# Patient Record
Sex: FEMALE | Race: WHITE | NOT HISPANIC OR LATINO | URBAN - METROPOLITAN AREA
[De-identification: names, ages, dates, MRNs, and addresses within clinical notes are randomized per-mention and may not be internally consistent; named-entity substitution may affect disease eponyms.]

---

## 2018-04-16 ENCOUNTER — OUTPATIENT (OUTPATIENT)
Dept: OUTPATIENT SERVICES | Facility: HOSPITAL | Age: 53
LOS: 1 days | End: 2018-04-16
Payer: COMMERCIAL

## 2018-04-16 DIAGNOSIS — K22.9 DISEASE OF ESOPHAGUS, UNSPECIFIED: ICD-10-CM

## 2018-04-16 LAB — HCG UR QL: NEGATIVE — SIGNIFICANT CHANGE UP

## 2018-04-16 PROCEDURE — 88305 TISSUE EXAM BY PATHOLOGIST: CPT

## 2018-04-16 PROCEDURE — 88312 SPECIAL STAINS GROUP 1: CPT

## 2018-04-16 PROCEDURE — 43239 EGD BIOPSY SINGLE/MULTIPLE: CPT

## 2018-04-16 PROCEDURE — 45385 COLONOSCOPY W/LESION REMOVAL: CPT

## 2018-04-16 PROCEDURE — 88312 SPECIAL STAINS GROUP 1: CPT | Mod: 26

## 2018-04-16 PROCEDURE — 88305 TISSUE EXAM BY PATHOLOGIST: CPT | Mod: 26

## 2018-04-16 PROCEDURE — 81025 URINE PREGNANCY TEST: CPT

## 2018-04-18 LAB — SURGICAL PATHOLOGY FINAL REPORT - CH: SIGNIFICANT CHANGE UP

## 2019-06-01 ENCOUNTER — EMERGENCY (EMERGENCY)
Facility: HOSPITAL | Age: 54
LOS: 1 days | Discharge: HOME | End: 2019-06-01
Attending: EMERGENCY MEDICINE | Admitting: EMERGENCY MEDICINE
Payer: COMMERCIAL

## 2019-06-01 VITALS
DIASTOLIC BLOOD PRESSURE: 71 MMHG | HEART RATE: 71 BPM | RESPIRATION RATE: 17 BRPM | OXYGEN SATURATION: 98 % | TEMPERATURE: 99 F | SYSTOLIC BLOOD PRESSURE: 139 MMHG

## 2019-06-01 VITALS
HEIGHT: 65 IN | WEIGHT: 293 LBS | HEART RATE: 76 BPM | SYSTOLIC BLOOD PRESSURE: 165 MMHG | RESPIRATION RATE: 16 BRPM | OXYGEN SATURATION: 99 % | TEMPERATURE: 99 F | DIASTOLIC BLOOD PRESSURE: 94 MMHG

## 2019-06-01 LAB
ALBUMIN SERPL ELPH-MCNC: 3.7 G/DL — SIGNIFICANT CHANGE UP (ref 3.3–5)
ALP SERPL-CCNC: 51 U/L — SIGNIFICANT CHANGE UP (ref 40–120)
ALT FLD-CCNC: 55 U/L — SIGNIFICANT CHANGE UP (ref 12–78)
ANION GAP SERPL CALC-SCNC: 8 MMOL/L — SIGNIFICANT CHANGE UP (ref 5–17)
APTT BLD: 40.2 SEC — HIGH (ref 27.5–36.3)
AST SERPL-CCNC: 34 U/L — SIGNIFICANT CHANGE UP (ref 15–37)
BASOPHILS # BLD AUTO: 0.04 K/UL — SIGNIFICANT CHANGE UP (ref 0–0.2)
BASOPHILS NFR BLD AUTO: 0.5 % — SIGNIFICANT CHANGE UP (ref 0–2)
BILIRUB SERPL-MCNC: 0.3 MG/DL — SIGNIFICANT CHANGE UP (ref 0.2–1.2)
BUN SERPL-MCNC: 17 MG/DL — SIGNIFICANT CHANGE UP (ref 7–23)
CALCIUM SERPL-MCNC: 8.5 MG/DL — SIGNIFICANT CHANGE UP (ref 8.5–10.1)
CHLORIDE SERPL-SCNC: 112 MMOL/L — HIGH (ref 96–108)
CO2 SERPL-SCNC: 25 MMOL/L — SIGNIFICANT CHANGE UP (ref 22–31)
CREAT SERPL-MCNC: 0.72 MG/DL — SIGNIFICANT CHANGE UP (ref 0.5–1.3)
D DIMER BLD IA.RAPID-MCNC: <150 NG/ML DDU — SIGNIFICANT CHANGE UP
EOSINOPHIL # BLD AUTO: 0.1 K/UL — SIGNIFICANT CHANGE UP (ref 0–0.5)
EOSINOPHIL NFR BLD AUTO: 1.3 % — SIGNIFICANT CHANGE UP (ref 0–6)
GLUCOSE SERPL-MCNC: 91 MG/DL — SIGNIFICANT CHANGE UP (ref 70–99)
HCT VFR BLD CALC: 42.9 % — SIGNIFICANT CHANGE UP (ref 34.5–45)
HGB BLD-MCNC: 14.3 G/DL — SIGNIFICANT CHANGE UP (ref 11.5–15.5)
IMM GRANULOCYTES NFR BLD AUTO: 0.4 % — SIGNIFICANT CHANGE UP (ref 0–1.5)
INR BLD: 1.85 RATIO — HIGH (ref 0.88–1.16)
LACTATE SERPL-SCNC: 1.7 MMOL/L — SIGNIFICANT CHANGE UP (ref 0.7–2)
LYMPHOCYTES # BLD AUTO: 2.93 K/UL — SIGNIFICANT CHANGE UP (ref 1–3.3)
LYMPHOCYTES # BLD AUTO: 38.3 % — SIGNIFICANT CHANGE UP (ref 13–44)
MCHC RBC-ENTMCNC: 31.3 PG — SIGNIFICANT CHANGE UP (ref 27–34)
MCHC RBC-ENTMCNC: 33.3 GM/DL — SIGNIFICANT CHANGE UP (ref 32–36)
MCV RBC AUTO: 93.9 FL — SIGNIFICANT CHANGE UP (ref 80–100)
MONOCYTES # BLD AUTO: 0.49 K/UL — SIGNIFICANT CHANGE UP (ref 0–0.9)
MONOCYTES NFR BLD AUTO: 6.4 % — SIGNIFICANT CHANGE UP (ref 2–14)
NEUTROPHILS # BLD AUTO: 4.06 K/UL — SIGNIFICANT CHANGE UP (ref 1.8–7.4)
NEUTROPHILS NFR BLD AUTO: 53.1 % — SIGNIFICANT CHANGE UP (ref 43–77)
NRBC # BLD: 0 /100 WBCS — SIGNIFICANT CHANGE UP (ref 0–0)
PLATELET # BLD AUTO: 207 K/UL — SIGNIFICANT CHANGE UP (ref 150–400)
POTASSIUM SERPL-MCNC: 3.7 MMOL/L — SIGNIFICANT CHANGE UP (ref 3.5–5.3)
POTASSIUM SERPL-SCNC: 3.7 MMOL/L — SIGNIFICANT CHANGE UP (ref 3.5–5.3)
PROT SERPL-MCNC: 7.1 G/DL — SIGNIFICANT CHANGE UP (ref 6–8.3)
PROTHROM AB SERPL-ACNC: 21.3 SEC — HIGH (ref 10–12.9)
RBC # BLD: 4.57 M/UL — SIGNIFICANT CHANGE UP (ref 3.8–5.2)
RBC # FLD: 13.3 % — SIGNIFICANT CHANGE UP (ref 10.3–14.5)
SODIUM SERPL-SCNC: 145 MMOL/L — SIGNIFICANT CHANGE UP (ref 135–145)
TROPONIN I SERPL-MCNC: <.015 NG/ML — SIGNIFICANT CHANGE UP (ref 0.01–0.04)
WBC # BLD: 7.65 K/UL — SIGNIFICANT CHANGE UP (ref 3.8–10.5)
WBC # FLD AUTO: 7.65 K/UL — SIGNIFICANT CHANGE UP (ref 3.8–10.5)

## 2019-06-01 PROCEDURE — 84702 CHORIONIC GONADOTROPIN TEST: CPT

## 2019-06-01 PROCEDURE — 83605 ASSAY OF LACTIC ACID: CPT

## 2019-06-01 PROCEDURE — 85730 THROMBOPLASTIN TIME PARTIAL: CPT

## 2019-06-01 PROCEDURE — 93010 ELECTROCARDIOGRAM REPORT: CPT

## 2019-06-01 PROCEDURE — 87040 BLOOD CULTURE FOR BACTERIA: CPT

## 2019-06-01 PROCEDURE — 71260 CT THORAX DX C+: CPT

## 2019-06-01 PROCEDURE — 71260 CT THORAX DX C+: CPT | Mod: 26

## 2019-06-01 PROCEDURE — 80053 COMPREHEN METABOLIC PANEL: CPT

## 2019-06-01 PROCEDURE — 93970 EXTREMITY STUDY: CPT

## 2019-06-01 PROCEDURE — 71045 X-RAY EXAM CHEST 1 VIEW: CPT | Mod: 26

## 2019-06-01 PROCEDURE — 71045 X-RAY EXAM CHEST 1 VIEW: CPT

## 2019-06-01 PROCEDURE — 93005 ELECTROCARDIOGRAM TRACING: CPT

## 2019-06-01 PROCEDURE — 85027 COMPLETE CBC AUTOMATED: CPT

## 2019-06-01 PROCEDURE — 36415 COLL VENOUS BLD VENIPUNCTURE: CPT

## 2019-06-01 PROCEDURE — 84484 ASSAY OF TROPONIN QUANT: CPT

## 2019-06-01 PROCEDURE — 93970 EXTREMITY STUDY: CPT | Mod: 26

## 2019-06-01 PROCEDURE — 99285 EMERGENCY DEPT VISIT HI MDM: CPT

## 2019-06-01 PROCEDURE — 85379 FIBRIN DEGRADATION QUANT: CPT

## 2019-06-01 PROCEDURE — 99284 EMERGENCY DEPT VISIT MOD MDM: CPT | Mod: 25

## 2019-06-01 PROCEDURE — 85610 PROTHROMBIN TIME: CPT

## 2019-06-01 NOTE — ED ADULT NURSE NOTE - PMH
Clotting disorder    Hyperlipidemia    Hypertension Clotting disorder    Hyperlipidemia    Hypertension    Lupus

## 2019-06-01 NOTE — ED ADULT NURSE REASSESSMENT NOTE - NS ED NURSE REASSESS COMMENT FT1
Report taken from Olayinka TOMLIN RN. Pt received alert and oriented x 4, denies any chest pain, sob, palpitations. VSS. 20 G IV NOTED TO RIGHT HAND. Awaiting discharged.

## 2019-06-01 NOTE — ED PROVIDER NOTE - CLINICAL SUMMARY MEDICAL DECISION MAKING FREE TEXT BOX
chest pain with inspiration, sob x 6 weeks, recent travel, morbid obesity, will obtain labs, cxr, ekg, ce's, doppler

## 2019-06-01 NOTE — ED ADULT NURSE NOTE - NSIMPLEMENTINTERV_GEN_ALL_ED
Implemented All Universal Safety Interventions:  [place] to call system. Call bell, personal items and telephone within reach. Instruct patient to call for assistance. Room bathroom lighting operational. Non-slip footwear when patient is off stretcher. Physically safe environment: no spills, clutter or unnecessary equipment. Stretcher in lowest position, wheels locked, appropriate side rails in place.

## 2019-06-01 NOTE — ED PROVIDER NOTE - PROGRESS NOTE DETAILS
patient seen and evaluated by Dr Vangie Vásquez, cardiologist, advised can discharge patient to follow up with her cardiologist, and pmd, copy of results given, advised patient if condition worsens return to ED

## 2019-06-01 NOTE — ED PROVIDER NOTE - ATTENDING CONTRIBUTION TO CARE
pt with hx of sarcoid and lupus with nonspecific chest pain, SOB with recent travel over the past 6 weeks.  labs/imaging unremarkable.  Consult cardio: Dr. Vásquez cleared for discharge and outpt follow up.

## 2019-06-01 NOTE — ED ADULT NURSE REASSESSMENT NOTE - NSIMPLEMENTINTERV_GEN_ALL_ED
Implemented All Universal Safety Interventions:  [place] to call system. Call bell, personal items and telephone within reach. Instruct patient to call for assistance. Room bathroom lighting operational. Non-slip footwear when patient is off stretcher. Physically safe environment: no spills, clutter or unnecessary equipment. Stretcher in lowest position, wheels locked, appropriate side rails in place.

## 2019-06-01 NOTE — ED PROVIDER NOTE - OBJECTIVE STATEMENT
53 y female presents with states for past 6 weeks has had intermittent episodes of sob, chest pain with deep breathing and bilateral le swelling,  patient states she accounts the sob to her weight, states she has been doing a lot of traveling by airplane, states 6 times in 6 weeks has traveled by plane. states she is a nonsmoker,  uses cpap at hs.  states she has had a low grade temp , which she believes is from her lupus.  tmax at home 100.4.   was seen by her pmd Dr Duarte today was sent to ED for evaluation.  states she is on coumadin, was checked last week, was low at 1.8, had dose increased few days ago.

## 2019-06-01 NOTE — CONSULT NOTE ADULT - SUBJECTIVE AND OBJECTIVE BOX
CARDIOLOGY CONSULT NOTE    Patient is a 53y Female with a known history of :    HPI:      REVIEW OF SYSTEMS:    CONSTITUTIONAL: No fever, weight loss, or fatigue  EYES: No eye pain, visual disturbances, or discharge  ENMT:  No difficulty hearing, tinnitus, vertigo; No sinus or throat pain  NECK: No pain or stiffness  BREASTS: No pain, masses, or nipple discharge  RESPIRATORY: No cough, wheezing, chills or hemoptysis; No shortness of breath  CARDIOVASCULAR: No chest pain, palpitations, dizziness, or leg swelling  GASTROINTESTINAL: No abdominal or epigastric pain. No nausea, vomiting, or hematemesis; No diarrhea or constipation. No melena or hematochezia.  GENITOURINARY: No dysuria, frequency, hematuria, or incontinence  NEUROLOGICAL: No headaches, memory loss, loss of strength, numbness, or tremors  SKIN: No itching, burning, rashes, or lesions   LYMPH NODES: No enlarged glands  ENDOCRINE: No heat or cold intolerance; No hair loss  MUSCULOSKELETAL: No joint pain or swelling; No muscle, back, or extremity pain  PSYCHIATRIC: No depression, anxiety, mood swings, or difficulty sleeping  HEME/LYMPH: No easy bruising, or bleeding gums  ALLERGY AND IMMUNOLOGIC: No hives or eczema    MEDICATIONS  (STANDING):    MEDICATIONS  (PRN):      ALLERGIES: Paxil (Rash)  penicillin (Rash)      FAMILY HISTORY:      Social History:  Alochol:   Smoking:   Drug Use:   Marital Status:     I&O's Detail      PHYSICAL EXAMINATION:  -----------------------------  T(C): 37.2 (06-01-19 @ 19:19), Max: 37.2 (06-01-19 @ 13:42)  HR: 71 (06-01-19 @ 19:19) (71 - 76)  BP: 139/71 (06-01-19 @ 19:19) (139/71 - 165/94)  RR: 17 (06-01-19 @ 19:19) (16 - 17)  SpO2: 98% (06-01-19 @ 19:19) (98% - 99%)  Wt(kg): --    Height (cm): 165.1 (06-01 @ 13:42)  Weight (kg): 145.6 (06-01 @ 13:42)  BMI (kg/m2): 53.4 (06-01 @ 13:42)  BSA (m2): 2.42 (06-01 @ 13:42)    Constitutional: well developed, normal appearance, well groomed, well nourished, no deformities and no acute distress.   Eyes: the conjunctiva exhibited no abnormalities and the eyelids demonstrated no xanthelasmas.   HEENT: normal oral mucosa, no oral pallor and no oral cyanosis.   Neck: normal jugular venous A waves present, normal jugular venous V waves present and no jugular venous preston A waves.   Pulmonary: no respiratory distress, normal respiratory rhythm and effort, no accessory muscle use and lungs were clear to auscultation bilaterally.   Cardiovascular: heart rate and rhythm were normal, normal S1 and S2 and no murmur, gallop, rub, heave or thrill are present.   Abdomen: soft, non-tender, no hepato-splenomegaly and no abdominal mass palpated.   Musculoskeletal: the gait could not be assessed..   Extremities: no clubbing of the fingernails, no localized cyanosis, no petechial hemorrhages and no ischemic changes.   Skin: normal skin color and pigmentation, no rash, no venous stasis, no skin lesions, no skin ulcer and no xanthoma was observed.   Psychiatric: oriented to person, place, and time, the affect was normal, the mood was normal and not feeling anxious.     LABS:   --------  06-01    145  |  112<H>  |  17  ----------------------------<  91  3.7   |  25  |  0.72    Ca    8.5      01 Jun 2019 14:54    TPro  7.1  /  Alb  3.7  /  TBili  0.3  /  DBili  x   /  AST  34  /  ALT  55  /  AlkPhos  51  06-01                         14.3   7.65  )-----------( 207      ( 01 Jun 2019 14:54 )             42.9     PT/INR - ( 01 Jun 2019 14:54 )   PT: 21.3 sec;   INR: 1.85 ratio         PTT - ( 01 Jun 2019 14:54 )  PTT:40.2 sec    06-01 @ 14:54 CPK total:--, CKMB --, Troponin I - <.015 ng/mL [.015 - .045]            RADIOLOGY:  -----------------    < from: CT Chest w/ IV Cont (06.01.19 @ 17:15) >    EXAM:  CT CHEST IC                            PROCEDURE DATE:  06/01/2019          INTERPRETATION:  Exam Date: 6/1/2019 5:15 PM  Clinical Information: Shortness of breath  Technique:  CT of the chest was performed with axial images obtained from   the thoracic to the inlet to the bilateral adrenal glands following   administration of IV contrast 90 ml of Omnipaque 350 was injected   intravenously. Maximum intensity projection images were obtained.  Comparison:  None    FINDINGS:    Lungs, Airways and Pleura: Central tracheobronchial tree is grossly   patent. No endobronchial lesions. No pneumothorax. No pulmonary edema. No   pleural effusions. No discrete pulmonary nodules. No segmental   consolidations.    Heart and Great Vessels: Great vessels appear unremarkable. Heart is   normal in size. No pericardial effusions.    Mediastinum and Norma: within normal limits    Neck and Chest Wall: within normal limits    Bones: Mild degenerative changes.    Upper Abdomen:  Hepatomegaly with diffuse hepatic steatosis.    IMPRESSION:      No segmental consolidation. No pneumothorax.                 SHERYL SCHULER M.D., ATTENDING RADIOLOGIST  This document has been electronically signed. Jun 1 2019  5:28PM                < end of copied text >      ECG: NSR, LAD, IVCD, no acute changes

## 2019-06-01 NOTE — CONSULT NOTE ADULT - ASSESSMENT
54y/o w/f over weight. Seen at [facility]. Sister at bedside  History HTN, high cholesterol, Vitamin D deficiency, s/p TIA, Lupus, Sarcoidosis, anticardiolipin Ab (on coumadin)   Father and Brother with CAD    S/P implantable Loop recorder by Dr. Jozef Gambino for dizziness and feeling of heart racing that was removed after 3 years and nothing unusual found  S/P C/S  S/P lymph nodes and lung biopsy  Per patient had a normal Echocardiogram 12-18 months ago    Seen complaining of somewhat sharp mid-sterna discomfort yesterday and that she said [place] at the airport  She had not eaten. And felt better after she had something to drink  She also does heavy lifting at her job  Also since her recently traveling she has noticed an occasional dizziness getting up  Patient wants to go home    Impression:  Atypical cardiac complaints as above    Plan:  - EKG without evidence of ischemia and cardiac enzymes negative x1  - Therefore, patient may be discharged from a cardiac stand-point and follow-up with her Cardiologist  - Also follow-up with Pulmonary and possibly Neurology

## 2019-06-06 LAB
CULTURE RESULTS: SIGNIFICANT CHANGE UP
CULTURE RESULTS: SIGNIFICANT CHANGE UP
SPECIMEN SOURCE: SIGNIFICANT CHANGE UP
SPECIMEN SOURCE: SIGNIFICANT CHANGE UP

## 2019-11-06 ENCOUNTER — INPATIENT (INPATIENT)
Facility: HOSPITAL | Age: 54
LOS: 4 days | Discharge: HOME | DRG: 600 | End: 2019-11-11
Attending: INTERNAL MEDICINE | Admitting: INTERNAL MEDICINE
Payer: COMMERCIAL

## 2019-11-06 VITALS
TEMPERATURE: 101 F | WEIGHT: 293 LBS | RESPIRATION RATE: 16 BRPM | DIASTOLIC BLOOD PRESSURE: 77 MMHG | OXYGEN SATURATION: 98 % | HEIGHT: 65 IN | HEART RATE: 108 BPM | SYSTOLIC BLOOD PRESSURE: 139 MMHG

## 2019-11-06 DIAGNOSIS — L03.313 CELLULITIS OF CHEST WALL: ICD-10-CM

## 2019-11-06 DIAGNOSIS — Z29.9 ENCOUNTER FOR PROPHYLACTIC MEASURES, UNSPECIFIED: ICD-10-CM

## 2019-11-06 DIAGNOSIS — E78.5 HYPERLIPIDEMIA, UNSPECIFIED: ICD-10-CM

## 2019-11-06 DIAGNOSIS — G47.33 OBSTRUCTIVE SLEEP APNEA (ADULT) (PEDIATRIC): ICD-10-CM

## 2019-11-06 DIAGNOSIS — D68.61 ANTIPHOSPHOLIPID SYNDROME: ICD-10-CM

## 2019-11-06 DIAGNOSIS — Z98.891 HISTORY OF UTERINE SCAR FROM PREVIOUS SURGERY: Chronic | ICD-10-CM

## 2019-11-06 DIAGNOSIS — M32.9 SYSTEMIC LUPUS ERYTHEMATOSUS, UNSPECIFIED: ICD-10-CM

## 2019-11-06 DIAGNOSIS — R73.03 PREDIABETES: ICD-10-CM

## 2019-11-06 DIAGNOSIS — I10 ESSENTIAL (PRIMARY) HYPERTENSION: ICD-10-CM

## 2019-11-06 DIAGNOSIS — D86.9 SARCOIDOSIS, UNSPECIFIED: ICD-10-CM

## 2019-11-06 LAB
ALBUMIN SERPL ELPH-MCNC: 3.6 G/DL — SIGNIFICANT CHANGE UP (ref 3.3–5)
ALP SERPL-CCNC: 57 U/L — SIGNIFICANT CHANGE UP (ref 40–120)
ALT FLD-CCNC: 31 U/L — SIGNIFICANT CHANGE UP (ref 12–78)
ANION GAP SERPL CALC-SCNC: 7 MMOL/L — SIGNIFICANT CHANGE UP (ref 5–17)
APPEARANCE UR: ABNORMAL
APTT BLD: 36.9 SEC — SIGNIFICANT CHANGE UP (ref 28.5–37)
AST SERPL-CCNC: 30 U/L — SIGNIFICANT CHANGE UP (ref 15–37)
BASOPHILS # BLD AUTO: 0.04 K/UL — SIGNIFICANT CHANGE UP (ref 0–0.2)
BASOPHILS NFR BLD AUTO: 0.2 % — SIGNIFICANT CHANGE UP (ref 0–2)
BILIRUB SERPL-MCNC: 0.7 MG/DL — SIGNIFICANT CHANGE UP (ref 0.2–1.2)
BILIRUB UR-MCNC: ABNORMAL
BUN SERPL-MCNC: 16 MG/DL — SIGNIFICANT CHANGE UP (ref 7–23)
CALCIUM SERPL-MCNC: 9 MG/DL — SIGNIFICANT CHANGE UP (ref 8.5–10.1)
CHLORIDE SERPL-SCNC: 104 MMOL/L — SIGNIFICANT CHANGE UP (ref 96–108)
CO2 SERPL-SCNC: 27 MMOL/L — SIGNIFICANT CHANGE UP (ref 22–31)
COLOR SPEC: YELLOW — SIGNIFICANT CHANGE UP
CREAT SERPL-MCNC: 0.98 MG/DL — SIGNIFICANT CHANGE UP (ref 0.5–1.3)
CRP SERPL-MCNC: 13.29 MG/DL — HIGH (ref 0–0.4)
DIFF PNL FLD: ABNORMAL
EOSINOPHIL # BLD AUTO: 0.01 K/UL — SIGNIFICANT CHANGE UP (ref 0–0.5)
EOSINOPHIL NFR BLD AUTO: 0.1 % — SIGNIFICANT CHANGE UP (ref 0–6)
ERYTHROCYTE [SEDIMENTATION RATE] IN BLOOD: 34 MM/HR — HIGH (ref 0–20)
GLUCOSE SERPL-MCNC: 121 MG/DL — HIGH (ref 70–99)
GLUCOSE UR QL: NEGATIVE — SIGNIFICANT CHANGE UP
HCG SERPL-ACNC: 2 MIU/ML — SIGNIFICANT CHANGE UP
HCT VFR BLD CALC: 43 % — SIGNIFICANT CHANGE UP (ref 34.5–45)
HGB BLD-MCNC: 14.7 G/DL — SIGNIFICANT CHANGE UP (ref 11.5–15.5)
IMM GRANULOCYTES NFR BLD AUTO: 0.5 % — SIGNIFICANT CHANGE UP (ref 0–1.5)
INR BLD: 2.29 RATIO — HIGH (ref 0.88–1.16)
KETONES UR-MCNC: ABNORMAL
LACTATE SERPL-SCNC: 1.2 MMOL/L — SIGNIFICANT CHANGE UP (ref 0.7–2)
LEUKOCYTE ESTERASE UR-ACNC: ABNORMAL
LYMPHOCYTES # BLD AUTO: 1.58 K/UL — SIGNIFICANT CHANGE UP (ref 1–3.3)
LYMPHOCYTES # BLD AUTO: 9.9 % — LOW (ref 13–44)
MCHC RBC-ENTMCNC: 31.4 PG — SIGNIFICANT CHANGE UP (ref 27–34)
MCHC RBC-ENTMCNC: 34.2 GM/DL — SIGNIFICANT CHANGE UP (ref 32–36)
MCV RBC AUTO: 91.9 FL — SIGNIFICANT CHANGE UP (ref 80–100)
MONOCYTES # BLD AUTO: 1.1 K/UL — HIGH (ref 0–0.9)
MONOCYTES NFR BLD AUTO: 6.9 % — SIGNIFICANT CHANGE UP (ref 2–14)
NEUTROPHILS # BLD AUTO: 13.22 K/UL — HIGH (ref 1.8–7.4)
NEUTROPHILS NFR BLD AUTO: 82.4 % — HIGH (ref 43–77)
NITRITE UR-MCNC: NEGATIVE — SIGNIFICANT CHANGE UP
NRBC # BLD: 0 /100 WBCS — SIGNIFICANT CHANGE UP (ref 0–0)
PH UR: 5 — SIGNIFICANT CHANGE UP (ref 5–8)
PLATELET # BLD AUTO: 218 K/UL — SIGNIFICANT CHANGE UP (ref 150–400)
POTASSIUM SERPL-MCNC: 3.3 MMOL/L — LOW (ref 3.5–5.3)
POTASSIUM SERPL-SCNC: 3.3 MMOL/L — LOW (ref 3.5–5.3)
PROT SERPL-MCNC: 7.6 G/DL — SIGNIFICANT CHANGE UP (ref 6–8.3)
PROT UR-MCNC: 25 MG/DL
PROTHROM AB SERPL-ACNC: 26.6 SEC — HIGH (ref 10–12.9)
RBC # BLD: 4.68 M/UL — SIGNIFICANT CHANGE UP (ref 3.8–5.2)
RBC # FLD: 13.7 % — SIGNIFICANT CHANGE UP (ref 10.3–14.5)
SODIUM SERPL-SCNC: 138 MMOL/L — SIGNIFICANT CHANGE UP (ref 135–145)
SP GR SPEC: 1.03 — HIGH (ref 1.01–1.02)
UROBILINOGEN FLD QL: 1
WBC # BLD: 16.03 K/UL — HIGH (ref 3.8–10.5)
WBC # FLD AUTO: 16.03 K/UL — HIGH (ref 3.8–10.5)

## 2019-11-06 PROCEDURE — 71046 X-RAY EXAM CHEST 2 VIEWS: CPT | Mod: 26

## 2019-11-06 PROCEDURE — 93010 ELECTROCARDIOGRAM REPORT: CPT

## 2019-11-06 PROCEDURE — 99285 EMERGENCY DEPT VISIT HI MDM: CPT

## 2019-11-06 PROCEDURE — 76642 ULTRASOUND BREAST LIMITED: CPT | Mod: 26,LT

## 2019-11-06 PROCEDURE — 99222 1ST HOSP IP/OBS MODERATE 55: CPT

## 2019-11-06 RX ORDER — HYDROXYCHLOROQUINE SULFATE 200 MG
200 TABLET ORAL
Refills: 0 | Status: DISCONTINUED | OUTPATIENT
Start: 2019-11-06 | End: 2019-11-11

## 2019-11-06 RX ORDER — BACLOFEN 100 %
10 POWDER (GRAM) MISCELLANEOUS THREE TIMES A DAY
Refills: 0 | Status: DISCONTINUED | OUTPATIENT
Start: 2019-11-06 | End: 2019-11-11

## 2019-11-06 RX ORDER — SODIUM CHLORIDE 9 MG/ML
1000 INJECTION, SOLUTION INTRAVENOUS
Refills: 0 | Status: DISCONTINUED | OUTPATIENT
Start: 2019-11-06 | End: 2019-11-11

## 2019-11-06 RX ORDER — TRIAMTERENE/HYDROCHLOROTHIAZID 75 MG-50MG
1 TABLET ORAL
Qty: 0 | Refills: 0 | DISCHARGE

## 2019-11-06 RX ORDER — TRIAMTERENE/HYDROCHLOROTHIAZID 75 MG-50MG
1 TABLET ORAL DAILY
Refills: 0 | Status: DISCONTINUED | OUTPATIENT
Start: 2019-11-06 | End: 2019-11-11

## 2019-11-06 RX ORDER — ACETAMINOPHEN 500 MG
650 TABLET ORAL EVERY 6 HOURS
Refills: 0 | Status: DISCONTINUED | OUTPATIENT
Start: 2019-11-06 | End: 2019-11-11

## 2019-11-06 RX ORDER — LOSARTAN POTASSIUM 100 MG/1
1 TABLET, FILM COATED ORAL
Qty: 0 | Refills: 0 | DISCHARGE

## 2019-11-06 RX ORDER — WARFARIN SODIUM 2.5 MG/1
2 TABLET ORAL DAILY
Refills: 0 | Status: DISCONTINUED | OUTPATIENT
Start: 2019-11-06 | End: 2019-11-06

## 2019-11-06 RX ORDER — ACETAMINOPHEN 500 MG
650 TABLET ORAL ONCE
Refills: 0 | Status: COMPLETED | OUTPATIENT
Start: 2019-11-06 | End: 2019-11-06

## 2019-11-06 RX ORDER — WARFARIN SODIUM 2.5 MG/1
7 TABLET ORAL AT BEDTIME
Refills: 0 | Status: COMPLETED | OUTPATIENT
Start: 2019-11-06 | End: 2019-11-06

## 2019-11-06 RX ORDER — VANCOMYCIN HCL 1 G
1250 VIAL (EA) INTRAVENOUS
Refills: 0 | Status: DISCONTINUED | OUTPATIENT
Start: 2019-11-07 | End: 2019-11-09

## 2019-11-06 RX ORDER — LOSARTAN POTASSIUM 100 MG/1
50 TABLET, FILM COATED ORAL DAILY
Refills: 0 | Status: DISCONTINUED | OUTPATIENT
Start: 2019-11-06 | End: 2019-11-11

## 2019-11-06 RX ORDER — ESCITALOPRAM OXALATE 10 MG/1
1 TABLET, FILM COATED ORAL
Qty: 0 | Refills: 0 | DISCHARGE

## 2019-11-06 RX ORDER — VANCOMYCIN HCL 1 G
VIAL (EA) INTRAVENOUS
Refills: 0 | Status: DISCONTINUED | OUTPATIENT
Start: 2019-11-06 | End: 2019-11-09

## 2019-11-06 RX ORDER — WARFARIN SODIUM 2.5 MG/1
1 TABLET ORAL
Qty: 0 | Refills: 0 | DISCHARGE

## 2019-11-06 RX ORDER — SIMVASTATIN 20 MG/1
1 TABLET, FILM COATED ORAL
Qty: 0 | Refills: 0 | DISCHARGE

## 2019-11-06 RX ORDER — CHOLECALCIFEROL (VITAMIN D3) 125 MCG
50 CAPSULE ORAL
Qty: 0 | Refills: 0 | DISCHARGE

## 2019-11-06 RX ORDER — GLUCAGON INJECTION, SOLUTION 0.5 MG/.1ML
1 INJECTION, SOLUTION SUBCUTANEOUS ONCE
Refills: 0 | Status: DISCONTINUED | OUTPATIENT
Start: 2019-11-06 | End: 2019-11-11

## 2019-11-06 RX ORDER — DEXTROSE 50 % IN WATER 50 %
12.5 SYRINGE (ML) INTRAVENOUS ONCE
Refills: 0 | Status: DISCONTINUED | OUTPATIENT
Start: 2019-11-06 | End: 2019-11-11

## 2019-11-06 RX ORDER — VANCOMYCIN HCL 1 G
1250 VIAL (EA) INTRAVENOUS ONCE
Refills: 0 | Status: COMPLETED | OUTPATIENT
Start: 2019-11-06 | End: 2019-11-06

## 2019-11-06 RX ORDER — MORPHINE SULFATE 50 MG/1
2 CAPSULE, EXTENDED RELEASE ORAL EVERY 4 HOURS
Refills: 0 | Status: DISCONTINUED | OUTPATIENT
Start: 2019-11-06 | End: 2019-11-06

## 2019-11-06 RX ORDER — POTASSIUM CHLORIDE 20 MEQ
40 PACKET (EA) ORAL ONCE
Refills: 0 | Status: COMPLETED | OUTPATIENT
Start: 2019-11-06 | End: 2019-11-06

## 2019-11-06 RX ORDER — SIMVASTATIN 20 MG/1
40 TABLET, FILM COATED ORAL AT BEDTIME
Refills: 0 | Status: DISCONTINUED | OUTPATIENT
Start: 2019-11-06 | End: 2019-11-11

## 2019-11-06 RX ORDER — DEXTROSE 50 % IN WATER 50 %
15 SYRINGE (ML) INTRAVENOUS ONCE
Refills: 0 | Status: DISCONTINUED | OUTPATIENT
Start: 2019-11-06 | End: 2019-11-11

## 2019-11-06 RX ORDER — SODIUM CHLORIDE 9 MG/ML
1000 INJECTION INTRAMUSCULAR; INTRAVENOUS; SUBCUTANEOUS ONCE
Refills: 0 | Status: COMPLETED | OUTPATIENT
Start: 2019-11-06 | End: 2019-11-06

## 2019-11-06 RX ORDER — INSULIN LISPRO 100/ML
VIAL (ML) SUBCUTANEOUS
Refills: 0 | Status: DISCONTINUED | OUTPATIENT
Start: 2019-11-06 | End: 2019-11-11

## 2019-11-06 RX ORDER — CHOLECALCIFEROL (VITAMIN D3) 125 MCG
200 CAPSULE ORAL DAILY
Refills: 0 | Status: DISCONTINUED | OUTPATIENT
Start: 2019-11-06 | End: 2019-11-06

## 2019-11-06 RX ORDER — HYDROXYCHLOROQUINE SULFATE 200 MG
1 TABLET ORAL
Qty: 0 | Refills: 0 | DISCHARGE

## 2019-11-06 RX ORDER — TOPIRAMATE 25 MG
25 TABLET ORAL
Refills: 0 | Status: DISCONTINUED | OUTPATIENT
Start: 2019-11-06 | End: 2019-11-11

## 2019-11-06 RX ORDER — BACLOFEN 100 %
1 POWDER (GRAM) MISCELLANEOUS
Qty: 0 | Refills: 0 | DISCHARGE

## 2019-11-06 RX ORDER — CHOLECALCIFEROL (VITAMIN D3) 125 MCG
2000 CAPSULE ORAL DAILY
Refills: 0 | Status: DISCONTINUED | OUTPATIENT
Start: 2019-11-06 | End: 2019-11-11

## 2019-11-06 RX ORDER — WARFARIN SODIUM 2.5 MG/1
0 TABLET ORAL
Qty: 0 | Refills: 0 | DISCHARGE

## 2019-11-06 RX ORDER — SENNA PLUS 8.6 MG/1
2 TABLET ORAL AT BEDTIME
Refills: 0 | Status: DISCONTINUED | OUTPATIENT
Start: 2019-11-06 | End: 2019-11-11

## 2019-11-06 RX ORDER — METFORMIN HYDROCHLORIDE 850 MG/1
1 TABLET ORAL
Qty: 0 | Refills: 0 | DISCHARGE

## 2019-11-06 RX ORDER — WARFARIN SODIUM 2.5 MG/1
5 TABLET ORAL DAILY
Refills: 0 | Status: DISCONTINUED | OUTPATIENT
Start: 2019-11-06 | End: 2019-11-06

## 2019-11-06 RX ORDER — TOPIRAMATE 25 MG
1 TABLET ORAL
Qty: 0 | Refills: 0 | DISCHARGE

## 2019-11-06 RX ORDER — ESCITALOPRAM OXALATE 10 MG/1
20 TABLET, FILM COATED ORAL DAILY
Refills: 0 | Status: DISCONTINUED | OUTPATIENT
Start: 2019-11-06 | End: 2019-11-11

## 2019-11-06 RX ORDER — MORPHINE SULFATE 50 MG/1
2 CAPSULE, EXTENDED RELEASE ORAL EVERY 6 HOURS
Refills: 0 | Status: DISCONTINUED | OUTPATIENT
Start: 2019-11-06 | End: 2019-11-11

## 2019-11-06 RX ADMIN — Medication 650 MILLIGRAM(S): at 15:11

## 2019-11-06 RX ADMIN — Medication 650 MILLIGRAM(S): at 20:41

## 2019-11-06 RX ADMIN — SIMVASTATIN 40 MILLIGRAM(S): 20 TABLET, FILM COATED ORAL at 21:12

## 2019-11-06 RX ADMIN — Medication 10 MILLIGRAM(S): at 21:12

## 2019-11-06 RX ADMIN — SODIUM CHLORIDE 1000 MILLILITER(S): 9 INJECTION INTRAMUSCULAR; INTRAVENOUS; SUBCUTANEOUS at 14:35

## 2019-11-06 RX ADMIN — Medication 650 MILLIGRAM(S): at 15:52

## 2019-11-06 RX ADMIN — WARFARIN SODIUM 7 MILLIGRAM(S): 2.5 TABLET ORAL at 21:12

## 2019-11-06 RX ADMIN — Medication 40 MILLIEQUIVALENT(S): at 19:35

## 2019-11-06 RX ADMIN — SODIUM CHLORIDE 1000 MILLILITER(S): 9 INJECTION INTRAMUSCULAR; INTRAVENOUS; SUBCUTANEOUS at 15:52

## 2019-11-06 RX ADMIN — Medication 25 MILLIGRAM(S): at 19:35

## 2019-11-06 RX ADMIN — Medication 166.67 MILLIGRAM(S): at 19:33

## 2019-11-06 RX ADMIN — Medication 100 MILLIGRAM(S): at 15:11

## 2019-11-06 RX ADMIN — Medication 650 MILLIGRAM(S): at 19:35

## 2019-11-06 RX ADMIN — Medication 600 MILLIGRAM(S): at 16:35

## 2019-11-06 NOTE — H&P ADULT - ASSESSMENT
54 y/o female w/ PMHx HTN, HLD, TIA, SLE, sarcoidosis, anticardiolipin Ab (on Coumadin), pre-diabetes, presents to the ED c/o left upper breast/axilla pain x 2 days. Pt admitted to general medical floor for cellulitis, sepsis.

## 2019-11-06 NOTE — ED ADULT NURSE NOTE - NSIMPLEMENTINTERV_GEN_ALL_ED
Implemented All Fall with Harm Risk Interventions:  [place] to call system. Call bell, personal items and telephone within reach. Instruct patient to call for assistance. Room bathroom lighting operational. Non-slip footwear when patient is off stretcher. Physically safe environment: no spills, clutter or unnecessary equipment. Stretcher in lowest position, wheels locked, appropriate side rails in place. Provide visual cue, wrist band, yellow gown, etc. Monitor gait and stability. Monitor for mental status changes and reorient to person, place, and time. Review medications for side effects contributing to fall risk. Reinforce activity limits and safety measures with patient and family. Provide visual clues: red socks.

## 2019-11-06 NOTE — ED PROVIDER NOTE - SKIN, MLM
Skin normal color for race, warm, dry and intact. left lateral breast/axilla 12 cm of induration with surrounding redness warmth and ttp no other mass felt nipple not retracted

## 2019-11-06 NOTE — ED ADULT NURSE REASSESSMENT NOTE - NS ED NURSE REASSESS COMMENT FT1
Patient febrile at 100.4. Tylenol was already given. As per charge nurse ok for patient to go to floor. PORTIA Peralta [address] made aware.

## 2019-11-06 NOTE — H&P ADULT - PROBLEM SELECTOR PLAN 9
IMPROVE VTE Individual Risk Assessment        RISK                                                          Points  [  ] Previous VTE                                                3  [ x ] Thrombophilia                                             2  [  ] Lower limb paralysis                                    2        (unable to hold up >15 seconds)    [  ] Current Cancer                                             2         (within 6 months)  [  ] Immobilization > 24 hrs                              1  [  ] ICU/CCU stay > 24 hours                            1  [  ] Age > 60                                                    1  IMPROVE VTE Score _________ 2 - Continue pt home Coumadin 7mg PO qd for DVT ppx

## 2019-11-06 NOTE — H&P ADULT - PROBLEM SELECTOR PLAN 1
W/ sepsis likely 2/2 insect bite vs folliculitis, lactate nl   - Blood and urine cultures sent, f/u results   - Pt s/p 1L NS in ED  - Pt s/p IV Clindamycin in ED  - Start IV Vanco, Azactam   - Tylenol PRN for fever   - Pt received Tylenol 650mg PO in the ED - states pain is well controlled with that. It is very tender when palpated.   - Tylenol 650mg PO q6h for mild pain, Morphine IV 2mg q6h PRN for moderate pain   - Senna PRN for constipation if pt using moderate pain scale   - F/u AM labs, trend temps, white count W/ sepsis likely 2/2 insect bite vs folliculitis, lactate nl   - Blood and urine cultures sent, f/u results   - Pt s/p 1L NS in ED  - Pt s/p IV Clindamycin in ED  - Start IV Eduardo Hampton   - ID, Dr. Hull, consulted, f/u recs   - Tylenol PRN for fever   - Pt received Tylenol 650mg PO in the ED - states pain is well controlled with that. It is very tender when palpated.   - Tylenol 650mg PO q6h for mild pain, Morphine IV 2mg q6h PRN for moderate pain   - Senna PRN for constipation if pt using moderate pain scale   - F/u AM labs, trend temps, white count

## 2019-11-06 NOTE — H&P ADULT - HISTORY OF PRESENT ILLNESS
52 y/o female w/ PMHx HTN, HLD, TIA, SLE, sarcoidosis, anticardiolipin Ab (on Coumadin) presents to the ED c/o --     In ED, VS T 101.5F, , /77, RR 16 98% RA  Labs significant for WBC 16.03, K+ 3.3, ESR 34, PT 26.6, INR 2.29  UA:   EKG:  CXR: No acute findings   U/s breast: Extensive nonspecific subcutaneous edema lateral aspect of the left breast which could represent cellulitis. No discrete localized fluid collection to suggest abscess at this time.  Pt received Clindamycin 600mg IV x1, 1L NS x1, Tylenol 650mg PO x1 52 y/o female w/ PMHx HTN, HLD, TIA, SLE, sarcoidosis, anticardiolipin Ab (on Coumadin), pre-diabetes?, presents to the ED c/o left upper breast/axilla pain x 2 days. Pt states it has been progressively getting worse w/ increasing pain, swelling, spreading. The pain is non-radiating, currently 4/10. Pt states she might have been bit by an insect but is not positive, can localize a general area where the erythema and pain started from. Pt reports she became febrile @ tmax 101F yesterday, went to PCP who started her on Bactrim, of which she has taken two doses. Pt reports she took AM dose on empty stomach and had 1 episode of vomiting. Pt denies sick contacts, recent travel. Pt denies chills, HA, dizziness, chest pain, palpitations, SOB, abdominal pain, N/V/D/C.     In ED, VS T 101.5F, , /77, RR 16 98% RA  Labs significant for WBC 16.03, K+ 3.3, ESR 34, PT 26.6, INR 2.29  UA: Pending  EKG: Ordered   CXR: No acute findings   U/s breast: Extensive nonspecific subcutaneous edema lateral aspect of the left breast which could represent cellulitis. No discrete localized fluid collection to suggest abscess at this time.  Pt received Clindamycin 600mg IV x1, 1L NS x1, Tylenol 650mg PO x1 54 y/o female w/ PMHx HTN, HLD, TIA, SLE, sarcoidosis, anticardiolipin Ab (on Coumadin), pre-diabetes, MYRTLE, presents to the ED c/o left upper breast/axilla pain x 2 days. Pt states it has been progressively getting worse w/ increasing pain, swelling, spreading. The pain is non-radiating, currently 4/10. Pt states she might have been bit by an insect but is not positive, can localize a general area where the erythema and pain started from. Pt reports she became febrile @ tmax 101F yesterday, went to PCP who started her on Bactrim, of which she has taken two doses. Pt reports she took AM dose on empty stomach and had 1 episode of vomiting. Pt denies sick contacts, recent travel. Pt denies chills, HA, dizziness, chest pain, palpitations, SOB, abdominal pain, N/V/D/C.     In ED, VS T 101.5F, , /77, RR 16 98% RA  Labs significant for WBC 16.03, K+ 3.3, ESR 34, PT 26.6, INR 2.29  UA: Pending  EKG: Ordered   CXR: No acute findings   U/s breast: Extensive nonspecific subcutaneous edema lateral aspect of the left breast which could represent cellulitis. No discrete localized fluid collection to suggest abscess at this time.  Pt received Clindamycin 600mg IV x1, 1L NS x1, Tylenol 650mg PO x1

## 2019-11-06 NOTE — PATIENT PROFILE ADULT - DO YOU FEEL UNSAFE AT WORK?
----- Message from Tc Lemus MD sent at 7/10/2017 11:52 AM CDT -----  Last lab check of her calcium was normal. Please inform patient.  
..Patient notified of results as noted below by MD, patient verbalized understanding.  
not applicable

## 2019-11-06 NOTE — ED ADULT NURSE REASSESSMENT NOTE - NS ED NURSE REASSESS COMMENT FT1
Received report from John PEARCE. Patient resting in bed. Safety and comfort measures provided and maintained.

## 2019-11-06 NOTE — ED ADULT TRIAGE NOTE - CHIEF COMPLAINT QUOTE
"I got bit by something and now my doctor thinks it is infected."  pt has worsening rash and pain in left axilla/breast, febrile in triage' pt on sulfameth/trimethoprim 800/160mg from urgent care

## 2019-11-06 NOTE — PATIENT PROFILE ADULT - NSPROEDALEARNPREF_GEN_A_NUR
Dr. Lobo Chery (uro) [facility]
written material/individual instruction/verbal instruction

## 2019-11-06 NOTE — CONSULT NOTE ADULT - ASSESSMENT
52 y/o female w/ PMHx HTN, HLD, TIA, SLE, sarcoidosis, anticardiolipin Ab (on Coumadin) presents to the ED complaining of left chest and axilla redness worsening over the last 2 days. Likely cellulitis. Consulted for anticoagulation recommendation,    anticardiolipin Ab:   stable, on coumadin  continue on coumadin 5mg in the AM, 2mg at bedtime   f/u PT INR in the AM     tachycardia:  likely 2/2 sepsis, fever, axillary pain 54 y/o female w/ PMHx HTN, HLD, TIA, SLE, sarcoidosis, anticardiolipin Ab (on Coumadin) presents to the ED complaining of left chest and axilla redness worsening over the last 2 days. Likely cellulitis. Consulted for anticoagulation recommendation,    anticardiolipin Ab:   stable, on coumadin  continue on coumadin 5mg in the AM, 2mg at bedtime   f/u PT INR in the AM     tachycardia:  likely 2/2 sepsis, fever, axillary pain    to follow closely with you while admitted

## 2019-11-06 NOTE — ED PROVIDER NOTE - OBJECTIVE STATEMENT
Pt is a 53 yo female with pmhx of Clotting disorder on coumadin Hyperlipidemia Hypertension Lupus sarcoidosis here for left chest/axilla redness getting progressively worse over last 2 days. Pt states she may have gotten bit my something or had pimple in area which she popped next day noted redness and swelling with pt. Pt has fever since yesterday tmax 101 took tylenol 9 am pt went to urgent care took 2 doses of bactrim. Pt denies any chest pain sob fever cough.     pcp jethro

## 2019-11-06 NOTE — H&P ADULT - NSHPSOCIALHISTORY_GEN_ALL_CORE
, lives at home with , son   Able to perform all ADLs independently, no assistive walking devices   Denies tobacco, EtOH use

## 2019-11-06 NOTE — CONSULT NOTE ADULT - SUBJECTIVE AND OBJECTIVE BOX
Patient is a 54y old  Female who presents with a chief complaint of Cellulitis, sepsis (2019 15:45)      HPI:  52 y/o female w/ PMHx HTN, HLD, TIA, SLE, sarcoidosis, anticardiolipin Ab (on Coumadin) presents to the ED complaining of left chest and axilla redness worsening over the last 2 days. Pt states she was bit by an insect 2 days ago, scratched the area, started to develop redness and swelling. Pt had a fever since yesterday Tmax 101, took tylenol 0900 today, went to urgent care where she received two doses of bactrim. Pt states she feels nauseous after having bactrim on an empty stomach, admits to headache. Pt states she feels her fever is breaking. Pt denies chills, CP, palpitations, SOB, orthopnea, lightheadedness, dizziness.     In ED, VS T 101.5F, , /77, RR 16 98% RA  Labs significant for WBC 16.03, K+ 3.3, ESR 34, PT 26.6, INR 2.29  UA:   EKG:  CXR: No acute findings   U/s breast: Extensive nonspecific subcutaneous edema lateral aspect of the left breast which could represent cellulitis. No discrete localized fluid collection to suggest abscess at this time.  Pt received Clindamycin 600mg IV x1, 1L NS x1, Tylenol 650mg PO x1 (2019 15:45)      PAST MEDICAL & SURGICAL HISTORY:  Anticardiolipin antibody syndrome  Lupus  Hypertension  Hyperlipidemia  H/O  section            ECHO  FINDINGS:      MEDICATIONS  (STANDING):    MEDICATIONS  (PRN):      FAMILY HISTORY:  FH: CAD (coronary artery disease) father   HTN: brother, sister     Denies Family history of CAD or early MI      Constitutional: + fever, denies chills  HEENT: denies blurry vision, difficulty hearing  Respiratory: denies SOB, LIVE, cough  Cardiovascular: denies CP, palpitations, orthopnea, PND, LE edema  Gastrointestinal: denies nausea, vomiting, abdominal pain  Genitourinary: denies urinary changes  Skin: Denies rashes, itching  Neurologic: +headache, denies weakness, dizziness  Hematology/Oncology: denies bleeding, easy bruising  ROS negative except as noted above      SOCIAL HISTORY:  No tobacco, Alcohol or drug use    Vital Signs Last 24 Hrs  T(C): 37.7 (2019 15:41), Max: 38.6 (2019 14:11)  T(F): 99.8 (2019 15:41), Max: 101.5 (2019 14:11)  HR: 92 (2019 15:41) (92 - 108)  BP: 134/72 (2019 15:41) (134/72 - 139/77)  BP(mean): --  RR: 16 (2019 15:41) (16 - 16)  SpO2: 99% (2019 15:41) (98% - 99%)    Physical Exam:  General: Well developed, uncomfortable 2/2 left axilla pain, NAD  HEENT: NCAT, PERRLA, EOMI bl, moist mucous membranes   Neck: Supple, nontender, no mass  Neurology: A&Ox3, nonfocal, sensation intact   Respiratory: CTA B/L, No W/R/R  CV: RRR, +S1/S2, no murmurs, rubs or gallops  Abdominal: Soft, NT, ND +BSx4, no palpable masses  Extremities: No C/C/E, + peripheral pulses  MSK: diffuse joint pain 2/2 lupus, Normal ROM  Heme: No obvious ecchymosis or petechiae   Skin: left axilla 12 inch by 8 inch erythematous red swollen TTP, with 3-4 black speckles (likely the insect)       ECG:    I&O's Detail      LABS:                        14.7   16.03 )-----------( 218      ( 2019 14:48 )             43.0     11-    138  |  104  |  16  ----------------------------<  121<H>  3.3<L>   |  27  |  0.98    Ca    9.0      2019 14:48    TPro  7.6  /  Alb  3.6  /  TBili  0.7  /  DBili  x   /  AST  30  /  ALT  31  /  AlkPhos  57  11-06        PT/INR - ( 2019 14:48 )   PT: 26.6 sec;   INR: 2.29 ratio         PTT - ( 2019 14:48 )  PTT:36.9 sec    I&O's Summary    BNP  RADIOLOGY & ADDITIONAL STUDIES:    EXAM:  US BREAST LIMITED LT                            *** ADDENDUM 2019  ***    Underlying neoplasm in the left breast including possibility of   inflammatory carcinoma cannot be excluded      *** END OF ADDENDUM 2019  ***      PROCEDURE DATE:  2019          INTERPRETATION:  History: Redness left breast status post possible insect   bite.    Limited targeted ultrasound left breast area of interest.    There is extensive nonspecific subcutaneous edema lateral aspect of the   left breast region of interest which could represent cellulitis. No   discrete localized fluid collection to suggest abscess at this time.  Consider further evaluation to include an mammography and breast MR as   well as  surgical consultation. This report should not deter biopsy of a   clinically suspicious palpable abnormality.    Impression:    Extensive subcutaneous edema lateral left breast may represent   cellulitis. No discrete localized fluid collection.  See discussion above      ***Please see the addendum at the top of this report. It may contain   additional important information or changes.****          CATARINA SANCHEZ M.D., ATTENDING RADIOLOGIST  This document has been electronically signed. 2019  3:10PM  Addend:  CATARINA SANCHEZ M.D., ATTENDING RADIOLOGIST  This addendum was electronically signed on: 2019  4:03PM.      EXAM:  XR CHEST PA LAT 2V                            PROCEDURE DATE:  2019          INTERPRETATION:  Admission, insect bite.    PA lateral. Prior 2019.    Heart and mediastinum normal.  Lungs clear.  Costophrenic angles sharp   bilaterally.    IMPRESSION: Normal chest                CATARINA SANCHEZ M.D., ATTENDING RADIOLOGIST  This document has been electronically signed. 2019  3:31PM

## 2019-11-06 NOTE — H&P ADULT - PROBLEM SELECTOR PLAN 2
- Continue pt home Coumadin 2 mg, 5mg PO qd   - F/u AM PT/INR  - Cardio consulted, Dr. Delgadillo, recs appreciated

## 2019-11-06 NOTE — H&P ADULT - NSHPPHYSICALEXAM_GEN_ALL_CORE
Vital Signs Last 24 Hrs  T(C): 37.4 (06 Nov 2019 17:34), Max: 38.6 (06 Nov 2019 14:11)  T(F): 99.4 (06 Nov 2019 17:34), Max: 101.5 (06 Nov 2019 14:11)  HR: 87 (06 Nov 2019 17:34) (87 - 108)  BP: 124/72 (06 Nov 2019 17:34) (124/72 - 139/77)  RR: 16 (06 Nov 2019 17:34) (16 - 16)  SpO2: 97% (06 Nov 2019 17:34) (97% - 99%)    General: Well developed, well nourished, NAD  HEENT: NCAT, PERRLA, EOMI b/l, moist mucous membranes   Neck: Supple, nontender  Neurology: AA&Ox3, motor strength grossly intact, no obvious sensory deficits   Respiratory: Lungs CTA b/l, No W/R/R  CV: RRR, +S1/S2, no murmurs, rubs or gallops  Abdominal: Soft, NTND, +BSx4, no palpable masses  Extremities: No C/C/E, + peripheral pulses  MSK: Normal ROM, no joint erythema or warmth, no joint swelling   Heme: No obvious ecchymosis or petechiae   Skin: +Left axilla 12 x8 inch erythematous red swollen, very TTP, with 3-4 black speckles

## 2019-11-06 NOTE — H&P ADULT - PROBLEM SELECTOR PLAN 7
Chronic  - Per pt, sees cardiologist occasionally, had a echo done ~2 yrs ago, unsure of results   - Cardio, Dr. Delgadillo following, recs appreciated

## 2019-11-06 NOTE — PATIENT PROFILE ADULT - NSPROMUTPARTICIPCAREFT_GEN_A_NUR
----- Message from Taylor Powers sent at 1/22/2018  3:46 PM EST -----  Regarding: Dr. Corinna Waldron is requesting a refill for the patient of Metoprolol. Their number is [phone]. The patient's number is [phone].
Message left to return call
supportive family

## 2019-11-06 NOTE — ED PROVIDER NOTE - CLINICAL SUMMARY MEDICAL DECISION MAKING FREE TEXT BOX
Pt is a 55 yo female with abscess/cellulitis left breast will get labs us breast given fluids tylenol abx will admit for iv abx

## 2019-11-06 NOTE — ED PROVIDER NOTE - ATTENDING CONTRIBUTION TO CARE
pt c/o 2 days of worsening cellulitis to lateral aspect left breast/axilla s/p popping pimple. + fever. no ha, d/n/v, sob, cough, abd pain, urinary complaints.  pt examined with female pa bains present  wd, wn female, nad pink conjunctiva, mmm, s1s2 rrr lungs cta, cellulitis left breast/axilla with ?abscess, abd soft, nt, nd no cvat

## 2019-11-06 NOTE — ED ADULT NURSE NOTE - CAS TRG GEN SKIN CONDITION
Pt reports pain and drainage from belly button since yesterday.  Pt reports a few episodes of vomiting this morning.  Pt called surgeon and was unable to get an appointment so came to ER.   Dried drainage is noted to pt's umbilical area.  Pt denies fever but is complaining of body aches.     Tessy Patel RN  08/14/17 [id number]     Warm

## 2019-11-06 NOTE — H&P ADULT - PROBLEM SELECTOR PLAN 6
Per pt - pre-diabetic, on Metformin at home   - Hold Metformin   - Start low dose ISS, accu checks, hypoglycemic protocol   - F/u AM A1c

## 2019-11-06 NOTE — H&P ADULT - NSICDXPASTMEDICALHX_GEN_ALL_CORE_FT
PAST MEDICAL HISTORY:  Anticardiolipin antibody syndrome     Hyperlipidemia     Hypertension     Lupus PAST MEDICAL HISTORY:  Anticardiolipin antibody syndrome     Hyperlipidemia     Hypertension     Lupus     Obstructive sleep apnea     Pre-diabetes     Sarcoidosis

## 2019-11-06 NOTE — H&P ADULT - NSHPREVIEWOFSYSTEMS_GEN_ALL_CORE
Constitutional: +Fevers. Denies chills, general malaise, general weakness, diaphoresis   HEENT: Denies sore throat, photophobia, blurry vision, double vision  Respiratory: Denies SOB, LIVE, cough, sputum production, wheezing, hemoptysis  Cardiovascular: denies CP, palpitations, edema  Gastrointestinal: Denies nausea, vomiting, diarrhea, constipation, abdominal pain  Genitourinary: Denies dysuria, frequency, urgency, incontinence  Skin/Breast: +Left outer breast/axillary erythema, swelling, pain   Musculoskeletal: +Generalized arthralgias. Denies muscle weakness  Neurologic: Denies syncope, LOC, headache, weakness, dizziness, paresthesias  Psychiatric: Denies feeling anxious, depressed  Endocrine: Denies cold or heat intolerance, polydipsia, polyuria    ROS negative except as noted above

## 2019-11-07 DIAGNOSIS — N64.89 OTHER SPECIFIED DISORDERS OF BREAST: ICD-10-CM

## 2019-11-07 LAB
ALBUMIN SERPL ELPH-MCNC: 3.3 G/DL — SIGNIFICANT CHANGE UP (ref 3.3–5)
ALP SERPL-CCNC: 55 U/L — SIGNIFICANT CHANGE UP (ref 40–120)
ALT FLD-CCNC: 29 U/L — SIGNIFICANT CHANGE UP (ref 12–78)
ANION GAP SERPL CALC-SCNC: 5 MMOL/L — SIGNIFICANT CHANGE UP (ref 5–17)
AST SERPL-CCNC: 20 U/L — SIGNIFICANT CHANGE UP (ref 15–37)
BASOPHILS # BLD AUTO: 0.05 K/UL — SIGNIFICANT CHANGE UP (ref 0–0.2)
BASOPHILS NFR BLD AUTO: 0.3 % — SIGNIFICANT CHANGE UP (ref 0–2)
BILIRUB SERPL-MCNC: 1.2 MG/DL — SIGNIFICANT CHANGE UP (ref 0.2–1.2)
BUN SERPL-MCNC: 12 MG/DL — SIGNIFICANT CHANGE UP (ref 7–23)
CALCIUM SERPL-MCNC: 8.7 MG/DL — SIGNIFICANT CHANGE UP (ref 8.5–10.1)
CHLORIDE SERPL-SCNC: 103 MMOL/L — SIGNIFICANT CHANGE UP (ref 96–108)
CHOLEST SERPL-MCNC: 127 MG/DL — SIGNIFICANT CHANGE UP (ref 10–199)
CO2 SERPL-SCNC: 30 MMOL/L — SIGNIFICANT CHANGE UP (ref 22–31)
CREAT SERPL-MCNC: 0.83 MG/DL — SIGNIFICANT CHANGE UP (ref 0.5–1.3)
EOSINOPHIL # BLD AUTO: 0.07 K/UL — SIGNIFICANT CHANGE UP (ref 0–0.5)
EOSINOPHIL NFR BLD AUTO: 0.5 % — SIGNIFICANT CHANGE UP (ref 0–6)
GLUCOSE SERPL-MCNC: 128 MG/DL — HIGH (ref 70–99)
HBA1C BLD-MCNC: 5.4 % — SIGNIFICANT CHANGE UP (ref 4–5.6)
HCT VFR BLD CALC: 41.2 % — SIGNIFICANT CHANGE UP (ref 34.5–45)
HDLC SERPL-MCNC: 56 MG/DL — SIGNIFICANT CHANGE UP
HGB BLD-MCNC: 13.6 G/DL — SIGNIFICANT CHANGE UP (ref 11.5–15.5)
IMM GRANULOCYTES NFR BLD AUTO: 0.7 % — SIGNIFICANT CHANGE UP (ref 0–1.5)
INR BLD: 2.35 RATIO — HIGH (ref 0.88–1.16)
LIPID PNL WITH DIRECT LDL SERPL: 59 MG/DL — SIGNIFICANT CHANGE UP
LYMPHOCYTES # BLD AUTO: 13.9 % — SIGNIFICANT CHANGE UP (ref 13–44)
LYMPHOCYTES # BLD AUTO: 2 K/UL — SIGNIFICANT CHANGE UP (ref 1–3.3)
MCHC RBC-ENTMCNC: 31.2 PG — SIGNIFICANT CHANGE UP (ref 27–34)
MCHC RBC-ENTMCNC: 33 GM/DL — SIGNIFICANT CHANGE UP (ref 32–36)
MCV RBC AUTO: 94.5 FL — SIGNIFICANT CHANGE UP (ref 80–100)
MONOCYTES # BLD AUTO: 0.87 K/UL — SIGNIFICANT CHANGE UP (ref 0–0.9)
MONOCYTES NFR BLD AUTO: 6 % — SIGNIFICANT CHANGE UP (ref 2–14)
NEUTROPHILS # BLD AUTO: 11.34 K/UL — HIGH (ref 1.8–7.4)
NEUTROPHILS NFR BLD AUTO: 78.6 % — HIGH (ref 43–77)
NRBC # BLD: 0 /100 WBCS — SIGNIFICANT CHANGE UP (ref 0–0)
PLATELET # BLD AUTO: 204 K/UL — SIGNIFICANT CHANGE UP (ref 150–400)
POTASSIUM SERPL-MCNC: 3.4 MMOL/L — LOW (ref 3.5–5.3)
POTASSIUM SERPL-SCNC: 3.4 MMOL/L — LOW (ref 3.5–5.3)
PROT SERPL-MCNC: 7.3 G/DL — SIGNIFICANT CHANGE UP (ref 6–8.3)
PROTHROM AB SERPL-ACNC: 27.5 SEC — HIGH (ref 10–12.9)
RBC # BLD: 4.36 M/UL — SIGNIFICANT CHANGE UP (ref 3.8–5.2)
RBC # FLD: 13.9 % — SIGNIFICANT CHANGE UP (ref 10.3–14.5)
SODIUM SERPL-SCNC: 138 MMOL/L — SIGNIFICANT CHANGE UP (ref 135–145)
TOTAL CHOLESTEROL/HDL RATIO MEASUREMENT: 2.3 RATIO — LOW (ref 3.3–7.1)
TRIGL SERPL-MCNC: 59 MG/DL — SIGNIFICANT CHANGE UP (ref 10–149)
WBC # BLD: 14.43 K/UL — HIGH (ref 3.8–10.5)
WBC # FLD AUTO: 14.43 K/UL — HIGH (ref 3.8–10.5)

## 2019-11-07 PROCEDURE — 99232 SBSQ HOSP IP/OBS MODERATE 35: CPT

## 2019-11-07 PROCEDURE — 93010 ELECTROCARDIOGRAM REPORT: CPT

## 2019-11-07 RX ORDER — WARFARIN SODIUM 2.5 MG/1
5 TABLET ORAL AT BEDTIME
Refills: 0 | Status: DISCONTINUED | OUTPATIENT
Start: 2019-11-07 | End: 2019-11-07

## 2019-11-07 RX ORDER — WARFARIN SODIUM 2.5 MG/1
5 TABLET ORAL ONCE
Refills: 0 | Status: COMPLETED | OUTPATIENT
Start: 2019-11-07 | End: 2019-11-07

## 2019-11-07 RX ORDER — AZTREONAM 2 G
1000 VIAL (EA) INJECTION EVERY 8 HOURS
Refills: 0 | Status: DISCONTINUED | OUTPATIENT
Start: 2019-11-07 | End: 2019-11-07

## 2019-11-07 RX ORDER — AZTREONAM 2 G
VIAL (EA) INJECTION
Refills: 0 | Status: DISCONTINUED | OUTPATIENT
Start: 2019-11-07 | End: 2019-11-07

## 2019-11-07 RX ORDER — POTASSIUM CHLORIDE 20 MEQ
40 PACKET (EA) ORAL ONCE
Refills: 0 | Status: COMPLETED | OUTPATIENT
Start: 2019-11-07 | End: 2019-11-07

## 2019-11-07 RX ORDER — AZTREONAM 2 G
1000 VIAL (EA) INJECTION ONCE
Refills: 0 | Status: DISCONTINUED | OUTPATIENT
Start: 2019-11-07 | End: 2019-11-07

## 2019-11-07 RX ORDER — LACTOBACILLUS ACIDOPHILUS 100MM CELL
1 CAPSULE ORAL THREE TIMES A DAY
Refills: 0 | Status: DISCONTINUED | OUTPATIENT
Start: 2019-11-07 | End: 2019-11-11

## 2019-11-07 RX ORDER — CEFTRIAXONE 500 MG/1
1000 INJECTION, POWDER, FOR SOLUTION INTRAMUSCULAR; INTRAVENOUS EVERY 24 HOURS
Refills: 0 | Status: DISCONTINUED | OUTPATIENT
Start: 2019-11-07 | End: 2019-11-09

## 2019-11-07 RX ADMIN — Medication 25 MILLIGRAM(S): at 05:41

## 2019-11-07 RX ADMIN — Medication 200 MILLIGRAM(S): at 17:41

## 2019-11-07 RX ADMIN — CEFTRIAXONE 100 MILLIGRAM(S): 500 INJECTION, POWDER, FOR SOLUTION INTRAMUSCULAR; INTRAVENOUS at 11:51

## 2019-11-07 RX ADMIN — Medication 10 MILLIGRAM(S): at 05:41

## 2019-11-07 RX ADMIN — Medication 166.67 MILLIGRAM(S): at 17:42

## 2019-11-07 RX ADMIN — Medication 40 MILLIEQUIVALENT(S): at 09:46

## 2019-11-07 RX ADMIN — WARFARIN SODIUM 5 MILLIGRAM(S): 2.5 TABLET ORAL at 21:29

## 2019-11-07 RX ADMIN — SIMVASTATIN 40 MILLIGRAM(S): 20 TABLET, FILM COATED ORAL at 21:28

## 2019-11-07 RX ADMIN — Medication 2000 UNIT(S): at 11:51

## 2019-11-07 RX ADMIN — Medication 10 MILLIGRAM(S): at 21:28

## 2019-11-07 RX ADMIN — Medication 650 MILLIGRAM(S): at 05:42

## 2019-11-07 RX ADMIN — Medication 10 MILLIGRAM(S): at 11:52

## 2019-11-07 RX ADMIN — Medication 650 MILLIGRAM(S): at 17:43

## 2019-11-07 RX ADMIN — Medication 1 TABLET(S): at 05:41

## 2019-11-07 RX ADMIN — ESCITALOPRAM OXALATE 20 MILLIGRAM(S): 10 TABLET, FILM COATED ORAL at 11:52

## 2019-11-07 RX ADMIN — Medication 1 TABLET(S): at 11:52

## 2019-11-07 RX ADMIN — LOSARTAN POTASSIUM 50 MILLIGRAM(S): 100 TABLET, FILM COATED ORAL at 05:41

## 2019-11-07 RX ADMIN — Medication 166.67 MILLIGRAM(S): at 07:14

## 2019-11-07 RX ADMIN — Medication 25 MILLIGRAM(S): at 17:41

## 2019-11-07 RX ADMIN — Medication 1 TABLET(S): at 21:28

## 2019-11-07 RX ADMIN — Medication 650 MILLIGRAM(S): at 06:42

## 2019-11-07 RX ADMIN — Medication 200 MILLIGRAM(S): at 05:41

## 2019-11-07 NOTE — DIETITIAN INITIAL EVALUATION ADULT. - ENERGY NEEDS
Wt: 314 pounds, Ht: 65 inches, BMI: 52 kg/m2, IBW: 125 pounds  +/-10%, %IBW: 251%  +1 B/L ankle and feet edema, no pressure injuries

## 2019-11-07 NOTE — CONSULT NOTE ADULT - PROBLEM SELECTOR RECOMMENDATION 9
story and presentation is suggestive of purulent cellulitis with what might eventual evolve into an abscess but with extensive process somewhat atypical so concur with Vancomycin for MRSA coverage but have also added ceftriaxone for broader coverage.   Follow for possible abscess formation and potential need for I+D

## 2019-11-07 NOTE — DIETITIAN INITIAL EVALUATION ADULT. - PROBLEM SELECTOR PLAN 1
W/ sepsis likely 2/2 insect bite vs folliculitis, lactate nl   - Blood and urine cultures sent, f/u results   - Pt s/p 1L NS in ED  - Pt s/p IV Clindamycin in ED  - Start IV Eduardo Hampton   - ID, Dr. Hull, consulted, f/u recs   - Tylenol PRN for fever   - Pt received Tylenol 650mg PO in the ED - states pain is well controlled with that. It is very tender when palpated.   - Tylenol 650mg PO q6h for mild pain, Morphine IV 2mg q6h PRN for moderate pain   - Senna PRN for constipation if pt using moderate pain scale   - F/u AM labs, trend temps, white count

## 2019-11-07 NOTE — CONSULT NOTE ADULT - PROBLEM SELECTOR RECOMMENDATION 2
abn noted on US are hopefully due to inflammatory changes but will need follow up once inflammation improves.

## 2019-11-07 NOTE — PROGRESS NOTE ADULT - PROBLEM SELECTOR PLAN 1
W/ sepsis likely 2/2 insect bite vs folliculitis, lactate nl   - Blood and urine cultures sent, f/u results   - Pt s/p 1L NS in ED  - Pt s/p IV Clindamycin in ED  - Start IV Eduardo Hampton   - ID, Dr. Hull, consulted, f/u recs   - Tylenol PRN for fever   - Pt received Tylenol 650mg PO in the ED - states pain is well controlled with that. It is very tender when palpated.   - Tylenol 650mg PO q6h for mild pain, Morphine IV 2mg q6h PRN for moderate pain   - Senna PRN for constipation if pt using moderate pain scale   - F/u AM labs, trend temps, white count W/ sepsis likely 2/2 insect bite vs folliculitis, lactate nl   - Blood and urine cultures sent, f/u results   - Pt s/p 1L NS in ED  - Pt s/p IV Clindamycin in ED  - C/w IV Vanco for MRSA coverage, discontinue Azactam and start Ceftriaxone as per ID  - ID, Dr. Hull, consulted, f/u recs    - Follow for possible abscess formation and potential I+D    - Swelling is possibly due to inflammatory changes   - Tylenol PRN for fever   - Pt received Tylenol 650mg PO in the ED - states pain is well controlled with that. It is very tender when palpated.   - Tylenol 650mg PO q6h for mild pain, Morphine IV 2mg q6h PRN for moderate pain   - Senna PRN for constipation if pt using moderate pain scale   - F/u AM labs, trend temps, white count-100.7 in AM, WBC today (11/7/19)-14.43, trending down W/ sepsis likely 2/2 insect bite vs folliculitis, lactate nl   - C/w IV Vanco for MRSA coverage, discontinue Azactam and start Ceftriaxone as per ID  - ID, Dr. Martel, consulted, recs appreciated   - Follow for possible abscess formation and potential I+D, consider surgery consult in AM if no improvement    - Gyn consulted given addendum read of breast u/s - feels it is more of a surgery consult   - Tylenol PRN for fever   - Pt received Tylenol 650mg PO in the ED - states pain is well controlled with that. It is very tender when palpated.   - Tylenol 650mg PO q6h for mild pain, Morphine IV 2mg q6h PRN for moderate pain   - Senna PRN for constipation if pt using moderate pain scale   - F/u AM labs, trend temps, white count in AM

## 2019-11-07 NOTE — PROGRESS NOTE ADULT - PROBLEM SELECTOR PLAN 6
Per pt - pre-diabetic, on Metformin at home   - Hold Metformin   - Start low dose ISS, accu checks, hypoglycemic protocol   - F/u AM A1c Per pt - pre-diabetic, on Metformin at home   - Hold Metformin   - Start low dose ISS, accu checks, hypoglycemic protocol   - F/u AM A1C

## 2019-11-07 NOTE — CONSULT NOTE ADULT - SUBJECTIVE AND OBJECTIVE BOX
HPI:  54 y/o female w/ PMHx HTN, HLD, TIA, SLE, sarcoidosis, anticardiolipin Ab (on Coumadin), pre-diabetes, MYRTLE, presents to the ED c/o left upper breast/axilla pain x 2 days. Pt states it has been progressively getting worse w/ increasing pain, swelling, spreading. The pain is non-radiating, currently 4/10. Pt states she might have been bit by an insect but is not positive, can localize a general area where the erythema and pain started from. Pt reports she became febrile @ tmax 101F yesterday, went to PCP who started her on Bactrim, of which she has taken two doses. Pt reports she took AM dose on empty stomach and had 1 episode of vomiting. Pt denies sick contacts, recent travel. Pt denies chills, HA, dizziness, chest pain, palpitations, SOB, abdominal pain, N/V/D/C.     In ED, VS T 101.5F, , /77, RR 16 98% RA  Labs significant for WBC 16.03, K+ 3.3, ESR 34, PT 26.6, INR 2.29  UA: Pending  EKG: Ordered   CXR: No acute findings   U/s breast: Extensive nonspecific subcutaneous edema lateral aspect of the left breast which could represent cellulitis. No discrete localized fluid collection to suggest abscess at this time.  Pt received Clindamycin 600mg IV x1, 1L NS x1, Tylenol 650mg PO x1 (2019 15:45)      PAST MEDICAL & SURGICAL HISTORY:  Obstructive sleep apnea  Sarcoidosis  Pre-diabetes  Anticardiolipin antibody syndrome  Lupus  Hypertension  Hyperlipidemia  H/O  section      Antimicrobials  cefTRIAXone   IVPB 1000 milliGRAM(s) IV Intermittent every 24 hours  hydroxychloroquine 200 milliGRAM(s) Oral two times a day  vancomycin  IVPB 1250 milliGRAM(s) IV Intermittent two times a day  vancomycin  IVPB          Immunological      Other  acetaminophen   Tablet .. 650 milliGRAM(s) Oral every 6 hours PRN  acetaminophen   Tablet .. 650 milliGRAM(s) Oral every 6 hours PRN  baclofen 10 milliGRAM(s) Oral three times a day  cholecalciferol 2000 Unit(s) Oral daily  dextrose 40% Gel 15 Gram(s) Oral once PRN  dextrose 5%. 1000 milliLiter(s) IV Continuous <Continuous>  dextrose 50% Injectable 12.5 Gram(s) IV Push once  escitalopram 20 milliGRAM(s) Oral daily  glucagon  Injectable 1 milliGRAM(s) IntraMuscular once PRN  insulin lispro (HumaLOG) corrective regimen sliding scale   SubCutaneous three times a day before meals  losartan 50 milliGRAM(s) Oral daily  morphine  - Injectable 2 milliGRAM(s) IV Push every 6 hours PRN  multivitamin 1 Tablet(s) Oral daily  senna 2 Tablet(s) Oral at bedtime PRN  simvastatin 40 milliGRAM(s) Oral at bedtime  topiramate 25 milliGRAM(s) Oral two times a day  triamterene 37.5 mG/hydrochlorothiazide 25 mG Tablet 1 Tablet(s) Oral daily  warfarin 5 milliGRAM(s) Oral once      Allergies    Paxil (Rash)  penicillin (Rash)-when very young    Intolerances        SOCIAL HISTORY: no toxic habits reported    FAMILY HISTORY:  FH: CAD (coronary artery disease)      ROS:    EYES:  Negative  blurry vision or double vision  GASTROINTESTINAL:  Negative for nausea, vomiting, diarrhea  -otherwise negative except for subjective    Vital Signs Last 24 Hrs  T(C): 38.2 (2019 05:28), Max: 38.6 (2019 14:11)  T(F): 100.7 (2019 05:28), Max: 101.5 (2019 14:11)  HR: 92 (2019 05:28) (83 - 108)  BP: 123/77 (2019 05:28) (93/58 - 139/77)  BP(mean): --  RR: 17 (2019 05:28) (16 - 17)  SpO2: 95% (2019 05:28) (95% - 100%)    PE:  WDWN in no distress  HEENT:  NC, PERRL, sclerae anicteric, conjunctivae clear, EOMI.  Sinuses nontender, no nasal exudate.  No buccal or pharyngeal lesions, erythema or exudate  Neck:  Supple, no adenopathy  Lungs:  No accessory muscle use, bilaterally clear to auscultation  Cor:  RRR, S1, S2, no murmur appreciated  Abd:  Symmetric, normoactive BS.  Soft, nontender, no masses, guarding or rebound.  Liver and spleen not enlarged  Extrem:  No cyanosis or edema  Skin:  left chest with diffuse erythema but with localized tender area with induration but no obv fluctuance  Neuro: grossly intact  Musc: moving all limbs freely, no focal deficits    LABS:                        13.6   14.43 )-----------( 204      ( 2019 07:53 )             41.2         WBC Count: 14.43 K/uL (19 @ 07:53)  WBC Count: 16.03 K/uL (19 @ 14:48)          138  |  103  |  12  ----------------------------<  128<H>  3.4<L>   |  30  |  0.83    Ca    8.7      2019 07:53    TPro  7.3  /  Alb  3.3  /  TBili  1.2  /  DBili  x   /  AST  20  /  ALT  29  /  AlkPhos  55        Creatinine, Serum: 0.83 mg/dL (19 @ 07:53)  Creatinine, Serum: 0.98 mg/dL (19 @ 14:48)      Urinalysis Basic - ( 2019 17:33 )    Color: Yellow / Appearance: Slightly Turbid / S.030 / pH: x  Gluc: x / Ketone: Trace  / Bili: Small / Urobili: 1   Blood: x / Protein: 25 mg/dL / Nitrite: Negative   Leuk Esterase: Trace / RBC: 0-2 /HPF / WBC 0-2   Sq Epi: x / Non Sq Epi: Occasional / Bacteria: Occasional      MICROBIOLOGY:      RADIOLOGY & ADDITIONAL STUDIES:    --< from: US Breast Limited, Left (19 @ 14:58) >    EXAM:  US BREAST LIMITED LT                            *** ADDENDUM 2019  ***    Underlying neoplasm in the left breast including possibility of   inflammatory carcinoma cannot be excluded      *** END OF ADDENDUM 2019  ***      PROCEDURE DATE:  2019          INTERPRETATION:  History: Redness left breast status post possible insect   bite.    Limited targeted ultrasound left breast area of interest.    There is extensive nonspecific subcutaneous edema lateral aspect of the   left breast region of interest which could represent cellulitis. No   discrete localized fluid collection to suggest abscess at this time.  Consider further evaluation to include an mammography and breast MR as   well as  surgical consultation. This report should not deter biopsy of a   clinically suspicious palpable abnormality.    Impression:    Extensive subcutaneous edema lateral left breast may represent   cellulitis. No discrete localized fluid collection.  See discussion above      ***Pleasesee the addendum at the top of this report. It may contain   additional important information or changes.****

## 2019-11-07 NOTE — PROGRESS NOTE ADULT - PROBLEM SELECTOR PLAN 4
Minocycline Counseling: Patient advised regarding possible photosensitivity and discoloration of the teeth, skin, lips, tongue and gums.  Patient instructed to avoid sunlight, if possible.  When exposed to sunlight, patients should wear protective clothing, sunglasses, and sunscreen.  The patient was instructed to call the office immediately if the following severe adverse effects occur:  hearing changes, easy bruising/bleeding, severe headache, or vision changes.  The patient verbalized understanding of the proper use and possible adverse effects of minocycline.  All of the patient's questions and concerns were addressed. Spironolactone Pregnancy And Lactation Text: This medication can cause feminization of the male fetus and should be avoided during pregnancy. The active metabolite is also found in breast milk. Tetracycline Counseling: Patient counseled regarding possible photosensitivity and increased risk for sunburn.  Patient instructed to avoid sunlight, if possible.  When exposed to sunlight, patients should wear protective clothing, sunglasses, and sunscreen.  The patient was instructed to call the office immediately if the following severe adverse effects occur:  hearing changes, easy bruising/bleeding, severe headache, or vision changes.  The patient verbalized understanding of the proper use and possible adverse effects of tetracycline.  All of the patient's questions and concerns were addressed. Patient understands to avoid pregnancy while on therapy due to potential birth defects. Patient Specific Counseling (Will Not Stick From Patient To Patient): Recommend to use lotion prior to the tazorac 0.05% cream to help with tolerability. \\nPatient has been on her current Spironolactone dose for more than a year, labs have been stable in the past, no labs ordered today. Topical Sulfur Applications Pregnancy And Lactation Text: This medication is Pregnancy Category C and has an unknown safety profile during pregnancy. It is unknown if this topical medication is excreted in breast milk. Dapsone Counseling: I discussed with the patient the risks of dapsone including but not limited to hemolytic anemia, agranulocytosis, rashes, methemoglobinemia, kidney failure, peripheral neuropathy, headaches, GI upset, and liver toxicity.  Patients who start dapsone require monitoring including baseline LFTs and weekly CBCs for the first month, then every month thereafter.  The patient verbalized understanding of the proper use and possible adverse effects of dapsone.  All of the patient's questions and concerns were addressed. Chronic, stable   - Continue home BP meds with hold parameters Patient Specific Counseling (Will Not Stick From Patient To Patient): Discussed IPL Erythromycin Pregnancy And Lactation Text: This medication is Pregnancy Category B and is considered safe during pregnancy. It is also excreted in breast milk. Use Enhanced Medication Counseling?: No Azithromycin Counseling:  I discussed with the patient the risks of azithromycin including but not limited to GI upset, allergic reaction, drug rash, diarrhea, and yeast infections. Tazorac Counseling:  Patient advised that medication is irritating and drying.  Patient may need to apply sparingly and wash off after an hour before eventually leaving it on overnight.  The patient verbalized understanding of the proper use and possible adverse effects of tazorac.  All of the patient's questions and concerns were addressed. Benzoyl Peroxide Pregnancy And Lactation Text: This medication is Pregnancy Category C. It is unknown if benzoyl peroxide is excreted in breast milk. Bactrim Counseling:  I discussed with the patient the risks of sulfa antibiotics including but not limited to GI upset, allergic reaction, drug rash, diarrhea, dizziness, photosensitivity, and yeast infections.  Rarely, more serious reactions can occur including but not limited to aplastic anemia, agranulocytosis, methemoglobinemia, blood dyscrasias, liver or kidney failure, lung infiltrates or desquamative/blistering drug rashes. Isotretinoin Counseling: Patient should get monthly blood tests, not donate blood, not drive at night if vision affected, not share medication, and not undergo elective surgery for 6 months after tx completed. Side effects reviewed, pt to contact office should one occur. Birth Control Pills Pregnancy And Lactation Text: This medication should be avoided if pregnant and for the first 30 days post-partum. Detail Level: Zone High Dose Vitamin A Counseling: Side effects reviewed, pt to contact office should one occur. Erythromycin Counseling:  I discussed with the patient the risks of erythromycin including but not limited to GI upset, allergic reaction, drug rash, diarrhea, increase in liver enzymes, and yeast infections. Doxycycline Counseling:  Patient counseled regarding possible photosensitivity and increased risk for sunburn.  Patient instructed to avoid sunlight, if possible.  When exposed to sunlight, patients should wear protective clothing, sunglasses, and sunscreen.  The patient was instructed to call the office immediately if the following severe adverse effects occur:  hearing changes, easy bruising/bleeding, severe headache, or vision changes.  The patient verbalized understanding of the proper use and possible adverse effects of doxycycline.  All of the patient's questions and concerns were addressed. Topical Clindamycin Counseling: Patient counseled that this medication may cause skin irritation or allergic reactions.  In the event of skin irritation, the patient was advised to reduce the amount of the drug applied or use it less frequently.   The patient verbalized understanding of the proper use and possible adverse effects of clindamycin.  All of the patient's questions and concerns were addressed. Chronic, stable   - Continue home BP meds with hold parameters (Losartan) Topical Sulfur Applications Counseling: Topical Sulfur Counseling: Patient counseled that this medication may cause skin irritation or allergic reactions.  In the event of skin irritation, the patient was advised to reduce the amount of the drug applied or use it less frequently.   The patient verbalized understanding of the proper use and possible adverse effects of topical sulfur application.  All of the patient's questions and concerns were addressed. Spironolactone Counseling: Patient advised regarding risks of diarrhea, abdominal pain, hyperkalemia, birth defects (for female patients), liver toxicity and renal toxicity. The patient may need blood work to monitor liver and kidney function and potassium levels while on therapy. The patient verbalized understanding of the proper use and possible adverse effects of spironolactone.  All of the patient's questions and concerns were addressed. Doxycycline Pregnancy And Lactation Text: This medication is Pregnancy Category D and not consider safe during pregnancy. It is also excreted in breast milk but is considered safe for shorter treatment courses. Topical Retinoid Pregnancy And Lactation Text: This medication is Pregnancy Category C. It is unknown if this medication is excreted in breast milk. Azithromycin Pregnancy And Lactation Text: This medication is considered safe during pregnancy and is also secreted in breast milk. Bactrim Pregnancy And Lactation Text: This medication is Pregnancy Category D and is known to cause fetal risk.  It is also excreted in breast milk. Birth Control Pills Counseling: Birth Control Pill Counseling: I discussed with the patient the potential side effects of OCPs including but not limited to increased risk of stroke, heart attack, thrombophlebitis, deep venous thrombosis, hepatic adenomas, breast changes, GI upset, headaches, and depression.  The patient verbalized understanding of the proper use and possible adverse effects of OCPs. All of the patient's questions and concerns were addressed. Minocycline Pregnancy And Lactation Text: This medication is Pregnancy Category D and not consider safe during pregnancy. It is also excreted in breast milk. Dapsone Pregnancy And Lactation Text: This medication is Pregnancy Category C and is not considered safe during pregnancy or breast feeding. Benzoyl Peroxide Counseling: Patient counseled that medicine may cause skin irritation and bleach clothing.  In the event of skin irritation, the patient was advised to reduce the amount of the drug applied or use it less frequently.   The patient verbalized understanding of the proper use and possible adverse effects of benzoyl peroxide.  All of the patient's questions and concerns were addressed. Isotretinoin Pregnancy And Lactation Text: This medication is Pregnancy Category X and is considered extremely dangerous during pregnancy. It is unknown if it is excreted in breast milk. Tazorac Pregnancy And Lactation Text: This medication is not safe during pregnancy. It is unknown if this medication is excreted in breast milk. Topical Retinoid counseling:  Patient advised to apply a pea-sized amount only at bedtime and wait 30 minutes after washing their face before applying.  If too drying, patient may add a non-comedogenic moisturizer. The patient verbalized understanding of the proper use and possible adverse effects of retinoids.  All of the patient's questions and concerns were addressed. Topical Clindamycin Pregnancy And Lactation Text: This medication is Pregnancy Category B and is considered safe during pregnancy. It is unknown if it is excreted in breast milk. High Dose Vitamin A Pregnancy And Lactation Text: High dose vitamin A therapy is contraindicated during pregnancy and breast feeding.

## 2019-11-07 NOTE — PROGRESS NOTE ADULT - SUBJECTIVE AND OBJECTIVE BOX
[facility] -- Jae Valdez, Roberth, Isaak, Silvio Delgadillo Savella  Office # [phone]      Follow Up:    Anticardiolipin antibody syndrome & AC  Subjective/Observations:   No events overnight resting comfortably in bed.  No complaints of chest pain, dyspnea, or palpitations reported. No signs of orthopnea or PND.     REVIEW OF SYSTEMS: All other review of systems is negative unless indicated above    PAST MEDICAL & SURGICAL HISTORY:  Obstructive sleep apnea  Sarcoidosis  Pre-diabetes  Anticardiolipin antibody syndrome  Lupus  Hypertension  Hyperlipidemia  H/O  section      MEDICATIONS  (STANDING):  baclofen 10 milliGRAM(s) Oral three times a day  cefTRIAXone   IVPB 1000 milliGRAM(s) IV Intermittent every 24 hours  cholecalciferol 2000 Unit(s) Oral daily  dextrose 5%. 1000 milliLiter(s) (50 mL/Hr) IV Continuous <Continuous>  dextrose 50% Injectable 12.5 Gram(s) IV Push once  escitalopram 20 milliGRAM(s) Oral daily  hydroxychloroquine 200 milliGRAM(s) Oral two times a day  insulin lispro (HumaLOG) corrective regimen sliding scale   SubCutaneous three times a day before meals  losartan 50 milliGRAM(s) Oral daily  multivitamin 1 Tablet(s) Oral daily  simvastatin 40 milliGRAM(s) Oral at bedtime  topiramate 25 milliGRAM(s) Oral two times a day  triamterene 37.5 mG/hydrochlorothiazide 25 mG Tablet 1 Tablet(s) Oral daily  vancomycin  IVPB 1250 milliGRAM(s) IV Intermittent two times a day  vancomycin  IVPB      warfarin 5 milliGRAM(s) Oral once    MEDICATIONS  (PRN):  acetaminophen   Tablet .. 650 milliGRAM(s) Oral every 6 hours PRN Mild Pain (1 - 3)  acetaminophen   Tablet .. 650 milliGRAM(s) Oral every 6 hours PRN Temp greater or equal to 38C (100.4F)  dextrose 40% Gel 15 Gram(s) Oral once PRN Blood Glucose LESS THAN 70 milliGRAM(s)/deciliter  glucagon  Injectable 1 milliGRAM(s) IntraMuscular once PRN Glucose LESS THAN 70 milligrams/deciliter  morphine  - Injectable 2 milliGRAM(s) IV Push every 6 hours PRN Moderate Pain (4 - 6)  senna 2 Tablet(s) Oral at bedtime PRN Constipation      Allergies    Paxil (Rash)  penicillin (Rash)    Intolerances        Vital Signs Last 24 Hrs  T(C): 38.2 (2019 05:28), Max: 38.6 (2019 14:11)  T(F): 100.7 (2019 05:28), Max: 101.5 (2019 14:11)  HR: 92 (2019 05:28) (83 - 108)  BP: 123/77 (2019 05:28) (93/58 - 139/77)  BP(mean): --  RR: 17 (2019 05:28) (16 - 17)  SpO2: 95% (2019 05:28) (95% - 100%)    I&O's Summary    Weight (kg): 142.4 ( @ 14:11)    PHYSICAL EXAM:  TELE: Off tele   Constitutional: NAD, awake and alert, well-developed  HEENT: Moist Mucous Membranes, Anicteric  Pulmonary: Non-labored, breath sounds are clear bilaterally, No wheezing, crackles or rhonchi  Cardiovascular: Regular, S1 and S2 nl, No murmurs, rubs, gallops or clicks  Gastrointestinal: Bowel Sounds present, soft, nontender.   Lymph: No lymphadenopathy. No peripheral edema.  Skin: No visible rashes or ulcers.  Psych:  Mood & affect appropriate    LABS: All Labs Reviewed:                        13.6   14.43 )-----------( 204      ( 2019 07:53 )             41.2                         14.7   16.03 )-----------( 218      ( 2019 14:48 )             43.0     2019 07:53    138    |  103    |  12     ----------------------------<  128    3.4     |  30     |  0.83   2019 14:48    138    |  104    |  16     ----------------------------<  121    3.3     |  27     |  0.98     Ca    8.7        2019 07:53  Ca    9.0        2019 14:48    TPro  7.3    /  Alb  3.3    /  TBili  1.2    /  DBili  x      /  AST  20     /  ALT  29     /  AlkPhos  55     2019 07:53  TPro  7.6    /  Alb  3.6    /  TBili  0.7    /  DBili  x      /  AST  30     /  ALT  31     /  AlkPhos  57     2019 14:48    PT/INR - ( 2019 07:53 )   PT: 27.5 sec;   INR: 2.35 ratio         PTT - ( 2019 14:48 )  PTT:36.9 sec         ECG:  < from: 12 Lead ECG (19 @ 13:53) >  Ventricular Rate 75 BPM    Atrial Rate 75 BPM    P-R Interval 178 ms    QRS Duration 120 ms    Q-T Interval 402 ms    QTC Calculation(Bezet) 448 ms    P Axis 59 degrees    R Axis -5 degrees    T Axis 23 degrees    Diagnosis Line Normal sinus rhythm  Nonspecific intraventricular conduction delay  Borderline ECG  No previous ECGs available  Confirmed by Roberth WISDOM, Dejuan ([id number]) on 2019 11:18:23 AM    < end of copied text >    Echo:    Radiology:  < from: Xray Chest 2 Views PA/Lat (19 @ 15:27) >  EXAM:  XR CHEST PA LAT 2V                            PROCEDURE DATE:  2019          INTERPRETATION:  Admission, insect bite.    PA lateral. Prior 2019.    Heart and mediastinum normal.  Lungs clear.  Costophrenic angles sharp   bilaterally.    IMPRESSION: Normal chest                CATARINA SANCHEZ M.D., ATTENDING RADIOLOGIST  This document has been electronically signed. 2019  3:31PM    < end of copied text >

## 2019-11-07 NOTE — CHART NOTE - NSCHARTNOTEFT_GEN_A_CORE
Called by RN as patient reporting 5 episodes of watery diarrhea today. Patient seen and examined at bedside by PGY-1. Patient resting comfortably in bed. Reports 5 episodes of watery diarrhea today, denies any visible blood in stool or dark stool. Denies abdominal pain at the moment, but reports abdominal discomfort prior to BMs. Has been able to tolerate diet. No other complaints at this time. Denies headache, vision changes, CP, n/v, SOB, or palpitations.     T(C): 37.3 (11-07-19 @ 20:30), Max: 38.2 (11-07-19 @ 05:28)  HR: 85 (11-07-19 @ 20:30) (78 - 92)  BP: 120/75 (11-07-19 @ 20:30) (112/75 - 123/77)  RR: 17 (11-07-19 @ 20:30) (17 - 17)  SpO2: 95% (11-07-19 @ 20:30) (95% - 96%)    GENERAL: patient appears well, no acute distress  ENMT: moist mucous membranes  LUNGS: clear to auscultation  HEART: S1/S2, regular rate and rhythm  GASTROINTESTINAL: abdomen is obese, soft, nontender, nondistended, normoactive bowel sounds  NEUROLOGIC: awake, alert, oriented x3    Assessment/Plan: 54 y/o female w/ PMHx HTN, HLD, TIA, SLE, sarcoidosis, anticardiolipin Ab (on Coumadin), pre-diabetes, presents to the ED c/o left upper breast/axilla pain x 2 days. Pt admitted to general medical floor for cellulitis, sepsis. Now with multiple episodes of watery diarrhea.  - Diarrhea may be 2/2 antibiotic use versus c dif   - C dif PCR ordered given 5 episodes of loose stool in 12hour period  - Bacid ordered  - RN to call with changes    Elissa Paez MD, PGY-1  [phone]

## 2019-11-07 NOTE — DIETITIAN INITIAL EVALUATION ADULT. - OTHER INFO
54 year old female with PMH Of HTN, HLD, TIA, sarcoidosis, lupus, pre-DM (well controlled, on metformin, does not check fingersticks, current HgbA1c pending), MYRTLE, anticardiolipin antibody syndrome (on coumadin) admitted for cellulitis of chest wall.    Patient reports good appetite with no changes in intake PTA (despite referral generated for suboptimal po intake x5 days PTA?). Per flowsheets, consuming 100% of meals in house. Current weight is 314 pounds but has been gradually losing weight over the past 2 months. States  pounds with history of yo-yo dieting. Has lost >100 pounds twice over the past few years (lowest body weight 220 pounds) by making diet/exercise modifications (i.e., going to the gym 5-6x per week, working with , and consuming protein shakes as meal replacements).    Diet recall: breakfast - usually just a cup of tea, lunch - salad with shredded chicken, and dinner - roast beef, baked potato, and vegetable/salad.     Takes Vitamin D (has known deficiency - unsure of level but MD checks it yearly for her) unsure how many IUs she takes daily but is photosensitive 2/2 lupus so spends limited time in the sun. Also takes B complex. On coumadin PTA, aware of food drug interaction with Vitamin K. Last BM yesterday per pt.

## 2019-11-07 NOTE — PROGRESS NOTE ADULT - PROBLEM SELECTOR PLAN 2
- Continue pt home Coumadin 2 mg, 5mg PO qd   - F/u AM PT/INR  - Cardio consulted, Dr. Delgadillo, recs appreciated - Continue pt home Coumadin 2 mg, 5mg PO qd -5 mg AM, 2 mg bedtime as per Cardio   - F/u AM PT/INR-today (11/7/19)-25.5/2.35  - Cardio consulted, Dr. Delgadillo, recs appreciated Continue a/c with pt home Coumadin - will only give 5mg of Coumadin tonight in setting of IV abx use   - F/u AM PT/INR, INR today 2.35  - Cardio consulted, Dr. Delgadillo, recs appreciated

## 2019-11-07 NOTE — PROGRESS NOTE ADULT - ASSESSMENT
54 y/o female w/ PMHx HTN, HLD, TIA, SLE, sarcoidosis, anticardiolipin Ab (on Coumadin) presents to the ED complaining of left chest and axilla redness worsening over the last 2 days. Likely cellulitis. Consulted for anticoagulation recommendation,    anticardiolipin Ab:   stable, on coumadin  continue on coumadin 5mg in the AM, 2mg at bedtime   continue to trend INR/PT    tachycardia:  reactive  likely 2/2 sepsis, fever, axillary pain    HTN  - Controlled  - CW losartan    HLD  - CW statin   - monitor routine hemodynamics.   to follow closely with you while admitted    Dejuan Albarado [facility]  Cardiology   Spectra #[phone]

## 2019-11-07 NOTE — PROGRESS NOTE ADULT - ASSESSMENT
52 y/o female w/ PMHx HTN, HLD, TIA, SLE, sarcoidosis, anticardiolipin Ab (on Coumadin), pre-diabetes, presents to the ED c/o left upper breast/axilla pain x 2 days. Pt admitted to general medical floor for cellulitis, sepsis.

## 2019-11-07 NOTE — CONSULT NOTE ADULT - ASSESSMENT
52 y/o female w/ PMHx HTN, HLD, TIA, SLE, sarcoidosis, anticardiolipin Ab (on Coumadin), pre-diabetes, MYRTLE, presents to the ED c/o left upper breast/axilla pain x 2 days that did not respond to outpt Bactrim Rx

## 2019-11-07 NOTE — PROGRESS NOTE ADULT - PROBLEM SELECTOR PLAN 5
Chronic  - Continue home statin qhs  - F/u AM lipid panel Chronic  - Continue home statin qhs  - F/u AM lipid panel, today (11/7/19) negative

## 2019-11-07 NOTE — PROGRESS NOTE ADULT - SUBJECTIVE AND OBJECTIVE BOX
Patient is a 54y old  Female who presents with a chief complaint of Cellulitis, sepsis (2019 17:11)      INTERVAL HPI/OVERNIGHT EVENTS:  T(C): 38.2 (19 @ 05:28), Max: 38.6 (19 @ 14:11)  HR: 92 (19 @ 05:28) (83 - 108)  BP: 123/77 (19 @ 05:28) (93/58 - 139/77)  RR: 17 (19 @ 05:28) (16 - 17)  SpO2: 95% (19 @ 05:28) (95% - 100%)  Wt(kg): --  I&O's Summary      LABS:                        13.6   14.43 )-----------( 204      ( 2019 07:53 )             41.2         138  |  103  |  12  ----------------------------<  128<H>  3.4<L>   |  30  |  0.83    Ca    8.7      2019 07:53    TPro  7.3  /  Alb  3.3  /  TBili  1.2  /  DBili  x   /  AST  20  /  ALT  29  /  AlkPhos  55      PT/INR - ( 2019 07:53 )   PT: 27.5 sec;   INR: 2.35 ratio         PTT - ( 2019 14:48 )  PTT:36.9 sec  Urinalysis Basic - ( 2019 17:33 )    Color: Yellow / Appearance: Slightly Turbid / S.030 / pH: x  Gluc: x / Ketone: Trace  / Bili: Small / Urobili: 1   Blood: x / Protein: 25 mg/dL / Nitrite: Negative   Leuk Esterase: Trace / RBC: 0-2 /HPF / WBC 0-2   Sq Epi: x / Non Sq Epi: Occasional / Bacteria: Occasional      CAPILLARY BLOOD GLUCOSE      POCT Blood Glucose.: 111 mg/dL (2019 07:40)  POCT Blood Glucose.: 105 mg/dL (2019 21:23)        Urinalysis Basic - ( 2019 17:33 )    Color: Yellow / Appearance: Slightly Turbid / S.030 / pH: x  Gluc: x / Ketone: Trace  / Bili: Small / Urobili: 1   Blood: x / Protein: 25 mg/dL / Nitrite: Negative   Leuk Esterase: Trace / RBC: 0-2 /HPF / WBC 0-2   Sq Epi: x / Non Sq Epi: Occasional / Bacteria: Occasional        MEDICATIONS  (STANDING):  baclofen 10 milliGRAM(s) Oral three times a day  cholecalciferol 2000 Unit(s) Oral daily  dextrose 5%. 1000 milliLiter(s) (50 mL/Hr) IV Continuous <Continuous>  dextrose 50% Injectable 12.5 Gram(s) IV Push once  escitalopram 20 milliGRAM(s) Oral daily  hydroxychloroquine 200 milliGRAM(s) Oral two times a day  insulin lispro (HumaLOG) corrective regimen sliding scale   SubCutaneous three times a day before meals  losartan 50 milliGRAM(s) Oral daily  multivitamin 1 Tablet(s) Oral daily  potassium chloride    Tablet ER 40 milliEquivalent(s) Oral once  simvastatin 40 milliGRAM(s) Oral at bedtime  topiramate 25 milliGRAM(s) Oral two times a day  triamterene 37.5 mG/hydrochlorothiazide 25 mG Tablet 1 Tablet(s) Oral daily  vancomycin  IVPB 1250 milliGRAM(s) IV Intermittent two times a day  vancomycin  IVPB        MEDICATIONS  (PRN):  acetaminophen   Tablet .. 650 milliGRAM(s) Oral every 6 hours PRN Mild Pain (1 - 3)  acetaminophen   Tablet .. 650 milliGRAM(s) Oral every 6 hours PRN Temp greater or equal to 38C (100.4F)  dextrose 40% Gel 15 Gram(s) Oral once PRN Blood Glucose LESS THAN 70 milliGRAM(s)/deciliter  glucagon  Injectable 1 milliGRAM(s) IntraMuscular once PRN Glucose LESS THAN 70 milligrams/deciliter  morphine  - Injectable 2 milliGRAM(s) IV Push every 6 hours PRN Moderate Pain (4 - 6)  senna 2 Tablet(s) Oral at bedtime PRN Constipation      REVIEW OF SYSTEMS:  CONSTITUTIONAL: No fever, weight loss, or fatigue  EYES: No eye pain, visual disturbances, or discharge  ENMT:  No difficulty hearing, tinnitus, vertigo; No sinus or throat pain  NECK: No pain or stiffness  RESPIRATORY: No cough, wheezing, chills or hemoptysis; No shortness of breath  CARDIOVASCULAR: No chest pain, palpitations, dizziness, or leg swelling  GASTROINTESTINAL: No abdominal or epigastric pain. No nausea, vomiting, or hematemesis; No diarrhea or constipation. No melena or hematochezia.  GENITOURINARY: No dysuria, frequency, hematuria, or incontinence  NEUROLOGICAL: No headaches, memory loss, loss of strength, numbness, or tremors  SKIN: No itching, burning, rashes, or lesions   LYMPH NODES: No enlarged glands  ENDOCRINE: No heat or cold intolerance; No hair loss  MUSCULOSKELETAL: No joint pain or swelling; No muscle, back, or extremity pain  PSYCHIATRIC: No depression, anxiety, mood swings, or difficulty sleeping  HEME/LYMPH: No easy bruising, or bleeding gums  ALLERY AND IMMUNOLOGIC: No hives or eczema    RADIOLOGY & ADDITIONAL TESTS:    < from: US Breast Limited, Left (19 @ 14:58) >  *** ADDENDUM 2019  ***    Underlying neoplasm in the left breast including possibility of   inflammatory carcinoma cannot be excluded      *** END OF ADDENDUM 2019  ***      PROCEDURE DATE:  2019          INTERPRETATION:  History: Redness left breast status post possible insect   bite.    Limited targeted ultrasound left breast area of interest.    There is extensive nonspecific subcutaneous edema lateral aspect of the   left breast region of interest which could represent cellulitis. No   discrete localized fluid collection to suggest abscess at this time.  Consider further evaluation to include an mammography and breast MR as   well as  surgical consultation. This report should not deter biopsy of a   clinically suspicious palpable abnormality.    Impression:    Extensive subcutaneous edema lateral left breast may represent   cellulitis. No discrete localized fluid collection.  See discussion above      ***Pleasesee the addendum at the top of this report. It may contain   additional important information or changes.****    < end of copied text >      Imaging Personally Reviewed:  [ x ] YES  [ ] NO    Consultant(s) Notes Reviewed:  [ x ] YES  [ ] NO    PHYSICAL EXAM:  GENERAL: NAD, well-groomed, well-developed  HEAD:  Atraumatic, Normocephalic  EYES: EOMI, PERRLA, conjunctiva and sclera clear  ENMT: No tonsillar erythema, exudates, or enlargement; Moist mucous membranes, Good dentition, No lesions  NECK: Supple, No JVD, Normal thyroid  NERVOUS SYSTEM:  Alert & Oriented X3, Good concentration; Motor Strength 5/5 B/L upper and lower extremities; DTRs 2+ intact and symmetric  CHEST/LUNG: Clear to percussion bilaterally; No rales, rhonchi, wheezing, or rubs  HEART: Regular rate and rhythm; No murmurs, rubs, or gallops  ABDOMEN: Soft, Nontender, Nondistended; Bowel sounds present  EXTREMITIES:  2+ Peripheral Pulses, No clubbing, cyanosis, or edema  LYMPH: No lymphadenopathy noted  SKIN: No rashes or lesions    Care Discussed with Consultants/Other Providers [ x ] YES  [ ] NO Patient is a 54y old  Female who presents with a chief complaint of Cellulitis, sepsis (2019 17:11)      INTERVAL HPI/OVERNIGHT EVENTS: Pt was seen and examined by bedside. Pt reports that LT axilla/breast pain has been slightly improving, redness is still the same. Pt reports that her sx started on Friday where she noticed an erythematous and tender bump under her left axilla with a black spot which she attributed to a possible spider/bug bite. She states that she had a spider bite in the past that presented similarly-swelling and redness which resolved with antibiotics. Pt reports that she tried to pop the lump, no discharge was noted. On  she noticed that the redness had spread towards her left breast, and the hard bump grew as well. Pt has pain with movement of her arms. Pain is elicited with palpation of the lump, and she has tenderness/sensitivity with palpation of the erythema surrounding the lump. Pt denies taking any OTC medications for pain. She went to urgent care on Monday and received Mupirocin ointment as well as Bactrim (took two doses) with no relief of sx. Pt reports that she visited her step-father in the hospital on Saturday. She has history of a Staph infection, after visiting someone in the hospital. Pt denies any family history of breast cancer. Pt denies any chills, body aches, chest pain, SOB, palpitations, N/V/D/C, numbness, tingling.   Overnight events: Pt spiked a fever of 100.4 F and was given Tylenol.     T(C): 38.2 (19 @ 05:28), Max: 38.6 (19 @ 14:11)  HR: 92 (19 @ 05:28) (83 - 108)  BP: 123/77 (19 @ 05:28) (93/58 - 139/77)  RR: 17 (19 @ 05:28) (16 - 17)  SpO2: 95% (19 @ 05:28) (95% - 100%)  Wt(kg): --  I&O's Summary      LABS:                        13.6   14.43 )-----------( 204      ( 2019 07:53 )             41.2         138  |  103  |  12  ----------------------------<  128<H>  3.4<L>   |  30  |  0.83    Ca    8.7      2019 07:53    TPro  7.3  /  Alb  3.3  /  TBili  1.2  /  DBili  x   /  AST  20  /  ALT  29  /  AlkPhos  55      PT/INR - ( 2019 07:53 )   PT: 27.5 sec;   INR: 2.35 ratio         PTT - ( 2019 14:48 )  PTT:36.9 sec  Urinalysis Basic - ( 2019 17:33 )    Color: Yellow / Appearance: Slightly Turbid / S.030 / pH: x  Gluc: x / Ketone: Trace  / Bili: Small / Urobili: 1   Blood: x / Protein: 25 mg/dL / Nitrite: Negative   Leuk Esterase: Trace / RBC: 0-2 /HPF / WBC 0-2   Sq Epi: x / Non Sq Epi: Occasional / Bacteria: Occasional      CAPILLARY BLOOD GLUCOSE      POCT Blood Glucose.: 111 mg/dL (2019 07:40)  POCT Blood Glucose.: 105 mg/dL (2019 21:23)        Urinalysis Basic - ( 2019 17:33 )    Color: Yellow / Appearance: Slightly Turbid / S.030 / pH: x  Gluc: x / Ketone: Trace  / Bili: Small / Urobili: 1   Blood: x / Protein: 25 mg/dL / Nitrite: Negative   Leuk Esterase: Trace / RBC: 0-2 /HPF / WBC 0-2   Sq Epi: x / Non Sq Epi: Occasional / Bacteria: Occasional        MEDICATIONS  (STANDING):  baclofen 10 milliGRAM(s) Oral three times a day  cholecalciferol 2000 Unit(s) Oral daily  dextrose 5%. 1000 milliLiter(s) (50 mL/Hr) IV Continuous <Continuous>  dextrose 50% Injectable 12.5 Gram(s) IV Push once  escitalopram 20 milliGRAM(s) Oral daily  hydroxychloroquine 200 milliGRAM(s) Oral two times a day  insulin lispro (HumaLOG) corrective regimen sliding scale   SubCutaneous three times a day before meals  losartan 50 milliGRAM(s) Oral daily  multivitamin 1 Tablet(s) Oral daily  potassium chloride    Tablet ER 40 milliEquivalent(s) Oral once  simvastatin 40 milliGRAM(s) Oral at bedtime  topiramate 25 milliGRAM(s) Oral two times a day  triamterene 37.5 mG/hydrochlorothiazide 25 mG Tablet 1 Tablet(s) Oral daily  vancomycin  IVPB 1250 milliGRAM(s) IV Intermittent two times a day  vancomycin  IVPB        MEDICATIONS  (PRN):  acetaminophen   Tablet .. 650 milliGRAM(s) Oral every 6 hours PRN Mild Pain (1 - 3)  acetaminophen   Tablet .. 650 milliGRAM(s) Oral every 6 hours PRN Temp greater or equal to 38C (100.4F)  dextrose 40% Gel 15 Gram(s) Oral once PRN Blood Glucose LESS THAN 70 milliGRAM(s)/deciliter  glucagon  Injectable 1 milliGRAM(s) IntraMuscular once PRN Glucose LESS THAN 70 milligrams/deciliter  morphine  - Injectable 2 milliGRAM(s) IV Push every 6 hours PRN Moderate Pain (4 - 6)  senna 2 Tablet(s) Oral at bedtime PRN Constipation      REVIEW OF SYSTEMS:  CONSTITUTIONAL: +Fever, weight loss, or fatigue  EYES: No eye pain, visual disturbances, or discharge  ENMT:  No difficulty hearing, tinnitus, vertigo; No sinus or throat pain  NECK: No pain or stiffness  RESPIRATORY: No cough, wheezing, chills or hemoptysis; No shortness of breath  CARDIOVASCULAR: No chest pain, palpitations, dizziness, or leg swelling  GASTROINTESTINAL: No abdominal or epigastric pain. No nausea, vomiting, or hematemesis; No diarrhea or constipation. No melena or hematochezia.  GENITOURINARY: No dysuria, frequency, hematuria, or incontinence  NEUROLOGICAL: No headaches, memory loss, loss of strength, numbness, or tremors  SKIN: + Swelling and erythema under left axilla and LUQ of left breast , +Pain   LYMPH NODES: No enlarged glands  ENDOCRINE: No heat or cold intolerance; No hair loss  MUSCULOSKELETAL: No joint pain or swelling; No muscle, back, or extremity pain  PSYCHIATRIC: No depression, anxiety    RADIOLOGY & ADDITIONAL TESTS:    < from: US Breast Limited, Left (19 @ 14:58) >  *** ADDENDUM 2019  ***    Underlying neoplasm in the left breast including possibility of   inflammatory carcinoma cannot be excluded      *** END OF ADDENDUM 2019  ***      PROCEDURE DATE:  2019          INTERPRETATION:  History: Redness left breast status post possible insect   bite.    Limited targeted ultrasound left breast area of interest.    There is extensive nonspecific subcutaneous edema lateral aspect of the   left breast region of interest which could represent cellulitis. No   discrete localized fluid collection to suggest abscess at this time.  Consider further evaluation to include an mammography and breast MR as   well as  surgical consultation. This report should not deter biopsy of a   clinically suspicious palpable abnormality.    Impression:    Extensive subcutaneous edema lateral left breast may represent   cellulitis. No discrete localized fluid collection.  See discussion above      ***Pleasesee the addendum at the top of this report. It may contain   additional important information or changes.****    < end of copied text >      Imaging Personally Reviewed:  [ x ] YES  [ ] NO    Consultant(s) Notes Reviewed:  [ x ] YES  [ ] NO    PHYSICAL EXAM:  GENERAL: NAD, well-groomed, well-developed, obese  HEAD:  Atraumatic, Normocephalic  EYES: EOMI, PERRLA, conjunctiva and sclera clear  ENMT: No tonsillar erythema, exudates, or enlargement; Moist mucous membranes, Good dentition, No lesions  NECK: Supple  NERVOUS SYSTEM:  Alert & Oriented X3, Good concentration; Motor Strength 5/5 B/L upper and lower extremities  CHEST/LUNG: Clear to ascultation bilaterally; No rales, rhonchi, wheezing, or rubs  HEART: Regular rate and rhythm; No murmurs, rubs, or gallops  ABDOMEN: Soft, Nontender, Nondistended; Bowel sounds present  EXTREMITIES:  2+ Peripheral Pulses, No clubbing, cyanosis, or edema, +Pain with ROM of left arm   LYMPH: No lymphadenopathy noted  SKIN: + Erythema and swelling under left axilla extending to LUQ of left breast, tenderness to palpation, warm to touch, black speckle noted, no drainage seen   Care Discussed with Consultants/Other Providers [ x ] YES  [ ] NO Patient is a 54y old  Female who presents with a chief complaint of Cellulitis, sepsis (2019 17:11)      INTERVAL HPI/OVERNIGHT EVENTS: Pt was seen and examined by bedside. Pt reports that LT axilla/breast pain has been slightly improving, redness is still the same. Pt reports that her sx started on Friday where she noticed an erythematous and tender bump under her left axilla with a black spot which she attributed to a possible spider/bug bite. She states that she had a spider bite in the past that presented similarly-swelling and redness which resolved with antibiotics. Pt reports that she tried to pop the lump, no discharge was noted. On  she noticed that the redness had spread towards her left breast, and the hard bump grew as well. Pt has pain with movement of her arms. Pain is elicited with palpation of the lump, and she has tenderness/sensitivity with palpation of the erythema surrounding the lump. Pt denies taking any OTC medications for pain. She went to urgent care on Monday and received Mupirocin ointment as well as Bactrim (took two doses) with no relief of sx. Pt reports that she visited her step-father in the hospital on Saturday. She has history of a Staph infection, after visiting someone in the hospital. Pt denies any family history of breast cancer. Pt had a Lupus flare up recently. Pt denies any chills, body aches, chest pain, SOB, palpitations, N/V/D/C, numbness, tingling.   Overnight events: Pt spiked a fever of 100.4 F and was given Tylenol.     T(C): 38.2 (19 @ 05:28), Max: 38.6 (19 @ 14:11)  HR: 92 (19 @ 05:28) (83 - 108)  BP: 123/77 (19 @ 05:28) (93/58 - 139/77)  RR: 17 (19 @ 05:28) (16 - 17)  SpO2: 95% (19 @ 05:28) (95% - 100%)  Wt(kg): --  I&O's Summary      LABS:                        13.6   14.43 )-----------( 204      ( 2019 07:53 )             41.2     11    138  |  103  |  12  ----------------------------<  128<H>  3.4<L>   |  30  |  0.83    Ca    8.7      2019 07:53    TPro  7.3  /  Alb  3.3  /  TBili  1.2  /  DBili  x   /  AST  20  /  ALT  29  /  AlkPhos  55  11    PT/INR - ( 2019 07:53 )   PT: 27.5 sec;   INR: 2.35 ratio         PTT - ( 2019 14:48 )  PTT:36.9 sec  Urinalysis Basic - ( 2019 17:33 )    Color: Yellow / Appearance: Slightly Turbid / S.030 / pH: x  Gluc: x / Ketone: Trace  / Bili: Small / Urobili: 1   Blood: x / Protein: 25 mg/dL / Nitrite: Negative   Leuk Esterase: Trace / RBC: 0-2 /HPF / WBC 0-2   Sq Epi: x / Non Sq Epi: Occasional / Bacteria: Occasional      CAPILLARY BLOOD GLUCOSE      POCT Blood Glucose.: 111 mg/dL (2019 07:40)  POCT Blood Glucose.: 105 mg/dL (2019 21:23)        Urinalysis Basic - ( 2019 17:33 )    Color: Yellow / Appearance: Slightly Turbid / S.030 / pH: x  Gluc: x / Ketone: Trace  / Bili: Small / Urobili: 1   Blood: x / Protein: 25 mg/dL / Nitrite: Negative   Leuk Esterase: Trace / RBC: 0-2 /HPF / WBC 0-2   Sq Epi: x / Non Sq Epi: Occasional / Bacteria: Occasional        MEDICATIONS  (STANDING):  baclofen 10 milliGRAM(s) Oral three times a day  cholecalciferol 2000 Unit(s) Oral daily  dextrose 5%. 1000 milliLiter(s) (50 mL/Hr) IV Continuous <Continuous>  dextrose 50% Injectable 12.5 Gram(s) IV Push once  escitalopram 20 milliGRAM(s) Oral daily  hydroxychloroquine 200 milliGRAM(s) Oral two times a day  insulin lispro (HumaLOG) corrective regimen sliding scale   SubCutaneous three times a day before meals  losartan 50 milliGRAM(s) Oral daily  multivitamin 1 Tablet(s) Oral daily  potassium chloride    Tablet ER 40 milliEquivalent(s) Oral once  simvastatin 40 milliGRAM(s) Oral at bedtime  topiramate 25 milliGRAM(s) Oral two times a day  triamterene 37.5 mG/hydrochlorothiazide 25 mG Tablet 1 Tablet(s) Oral daily  vancomycin  IVPB 1250 milliGRAM(s) IV Intermittent two times a day  vancomycin  IVPB        MEDICATIONS  (PRN):  acetaminophen   Tablet .. 650 milliGRAM(s) Oral every 6 hours PRN Mild Pain (1 - 3)  acetaminophen   Tablet .. 650 milliGRAM(s) Oral every 6 hours PRN Temp greater or equal to 38C (100.4F)  dextrose 40% Gel 15 Gram(s) Oral once PRN Blood Glucose LESS THAN 70 milliGRAM(s)/deciliter  glucagon  Injectable 1 milliGRAM(s) IntraMuscular once PRN Glucose LESS THAN 70 milligrams/deciliter  morphine  - Injectable 2 milliGRAM(s) IV Push every 6 hours PRN Moderate Pain (4 - 6)  senna 2 Tablet(s) Oral at bedtime PRN Constipation      REVIEW OF SYSTEMS:  CONSTITUTIONAL: +Fever, weight loss, or fatigue  EYES: No eye pain, visual disturbances, or discharge  ENMT:  No difficulty hearing, tinnitus, vertigo; No sinus or throat pain  NECK: No pain or stiffness  RESPIRATORY: No cough, wheezing, chills or hemoptysis; No shortness of breath  CARDIOVASCULAR: No chest pain, palpitations, dizziness, or leg swelling  GASTROINTESTINAL: No abdominal or epigastric pain. No nausea, vomiting, or hematemesis; No diarrhea or constipation. No melena or hematochezia.  GENITOURINARY: No dysuria, frequency, hematuria, or incontinence  NEUROLOGICAL: No headaches, memory loss, loss of strength, numbness, or tremors  SKIN: + Swelling and erythema under left axilla and LUQ of left breast , +Pain   LYMPH NODES: No enlarged glands  ENDOCRINE: No heat or cold intolerance; No hair loss  MUSCULOSKELETAL: No joint pain or swelling; No muscle, back, or extremity pain  PSYCHIATRIC: No depression, anxiety    RADIOLOGY & ADDITIONAL TESTS:    < from: US Breast Limited, Left (19 @ 14:58) >  *** ADDENDUM 2019  ***    Underlying neoplasm in the left breast including possibility of   inflammatory carcinoma cannot be excluded      *** END OF ADDENDUM 2019  ***      PROCEDURE DATE:  2019          INTERPRETATION:  History: Redness left breast status post possible insect   bite.    Limited targeted ultrasound left breast area of interest.    There is extensive nonspecific subcutaneous edema lateral aspect of the   left breast region of interest which could represent cellulitis. No   discrete localized fluid collection to suggest abscess at this time.  Consider further evaluation to include an mammography and breast MR as   well as  surgical consultation. This report should not deter biopsy of a   clinically suspicious palpable abnormality.    Impression:    Extensive subcutaneous edema lateral left breast may represent   cellulitis. No discrete localized fluid collection.  See discussion above      ***Pleasesee the addendum at the top of this report. It may contain   additional important information or changes.****    < end of copied text >      Imaging Personally Reviewed:  [ x ] YES  [ ] NO    Consultant(s) Notes Reviewed:  [ x ] YES  [ ] NO    PHYSICAL EXAM:  GENERAL: NAD, well-groomed, well-developed, obese  HEAD:  Atraumatic, Normocephalic  EYES: EOMI, PERRLA, conjunctiva and sclera clear  ENMT: No tonsillar erythema, exudates, or enlargement; Moist mucous membranes, Good dentition, No lesions  NECK: Supple  NERVOUS SYSTEM:  Alert & Oriented X3, Good concentration; Motor Strength 5/5 B/L upper and lower extremities  CHEST/LUNG: Clear to ascultation bilaterally; No rales, rhonchi, wheezing, or rubs  HEART: Regular rate and rhythm; No murmurs, rubs, or gallops  ABDOMEN: Soft, Nontender, Nondistended; Bowel sounds present  EXTREMITIES:  2+ Peripheral Pulses, No clubbing, cyanosis, or edema, +Pain with ROM of left arm   LYMPH: No lymphadenopathy noted  SKIN: + Erythema and swelling under left axilla extending to LUQ of left breast, tenderness to palpation, warm to touch, black speckle noted, no drainage seen   Care Discussed with Consultants/Other Providers [ x ] YES  [ ] NO Patient is a 54y old  Female who presents with a chief complaint of Cellulitis, sepsis (2019 17:11)      INTERVAL HPI/OVERNIGHT EVENTS: Pt was seen and examined by bedside. Pt reports that LT axilla/breast pain has been slightly improving, redness has spread medically over upper breast, chest. Pt further clarified that her Sx started on Friday when she noticed an erythematous and tender bump under her left axilla with a black spot which she attributed to a possible spider/bug bite. She states that she had a spider bite in the past that presented similarly-swelling and redness which resolved with antibiotics. Pt reports that she tried to pop the lump, no discharge was noted. On  she noticed that the redness had spread towards her left breast, and the hard bump grew as well. Pt has pain with movement of her arm, palpation of the lump. Pt reports that she visited her step-father in the hospital on Saturday. She has history of a Staph infection, after visiting someone in the hospital. Pt denies any family history of breast cancer. Pt denies any chills, body aches, chest pain, SOB, palpitations, N/V/D/C, numbness, tingling.     *Of note, Pt takes 5mg and 2mg PO of Coumadin together. It is not split between AM and PM.     Overnight events: Pt spiked a fever of Tmax 100.4F, was given Tylenol 650mg PO x1.       T(C): 38.2 (19 @ 05:28), Max: 38.6 (19 @ 14:11)  HR: 92 (19 @ 05:28) (83 - 108)  BP: 123/77 (19 @ 05:28) (93/58 - 139/77)  RR: 17 (19 @ 05:28) (16 - 17)  SpO2: 95% (19 @ 05:28) (95% - 100%)  Wt(kg): --  I&O's Summary      LABS:                        13.6   14.43 )-----------( 204      ( 2019 07:53 )             41.2         138  |  103  |  12  ----------------------------<  128<H>  3.4<L>   |  30  |  0.83    Ca    8.7      2019 07:53    TPro  7.3  /  Alb  3.3  /  TBili  1.2  /  DBili  x   /  AST  20  /  ALT  29  /  AlkPhos  55  11-07    PT/INR - ( 2019 07:53 )   PT: 27.5 sec;   INR: 2.35 ratio         PTT - ( 2019 14:48 )  PTT:36.9 sec  Urinalysis Basic - ( 2019 17:33 )    Color: Yellow / Appearance: Slightly Turbid / S.030 / pH: x  Gluc: x / Ketone: Trace  / Bili: Small / Urobili: 1   Blood: x / Protein: 25 mg/dL / Nitrite: Negative   Leuk Esterase: Trace / RBC: 0-2 /HPF / WBC 0-2   Sq Epi: x / Non Sq Epi: Occasional / Bacteria: Occasional      CAPILLARY BLOOD GLUCOSE      POCT Blood Glucose.: 111 mg/dL (2019 07:40)  POCT Blood Glucose.: 105 mg/dL (2019 21:23)        Urinalysis Basic - ( 2019 17:33 )    Color: Yellow / Appearance: Slightly Turbid / S.030 / pH: x  Gluc: x / Ketone: Trace  / Bili: Small / Urobili: 1   Blood: x / Protein: 25 mg/dL / Nitrite: Negative   Leuk Esterase: Trace / RBC: 0-2 /HPF / WBC 0-2   Sq Epi: x / Non Sq Epi: Occasional / Bacteria: Occasional        MEDICATIONS  (STANDING):  baclofen 10 milliGRAM(s) Oral three times a day  cholecalciferol 2000 Unit(s) Oral daily  dextrose 5%. 1000 milliLiter(s) (50 mL/Hr) IV Continuous <Continuous>  dextrose 50% Injectable 12.5 Gram(s) IV Push once  escitalopram 20 milliGRAM(s) Oral daily  hydroxychloroquine 200 milliGRAM(s) Oral two times a day  insulin lispro (HumaLOG) corrective regimen sliding scale   SubCutaneous three times a day before meals  losartan 50 milliGRAM(s) Oral daily  multivitamin 1 Tablet(s) Oral daily  potassium chloride    Tablet ER 40 milliEquivalent(s) Oral once  simvastatin 40 milliGRAM(s) Oral at bedtime  topiramate 25 milliGRAM(s) Oral two times a day  triamterene 37.5 mG/hydrochlorothiazide 25 mG Tablet 1 Tablet(s) Oral daily  vancomycin  IVPB 1250 milliGRAM(s) IV Intermittent two times a day  vancomycin  IVPB        MEDICATIONS  (PRN):  acetaminophen   Tablet .. 650 milliGRAM(s) Oral every 6 hours PRN Mild Pain (1 - 3)  acetaminophen   Tablet .. 650 milliGRAM(s) Oral every 6 hours PRN Temp greater or equal to 38C (100.4F)  dextrose 40% Gel 15 Gram(s) Oral once PRN Blood Glucose LESS THAN 70 milliGRAM(s)/deciliter  glucagon  Injectable 1 milliGRAM(s) IntraMuscular once PRN Glucose LESS THAN 70 milligrams/deciliter  morphine  - Injectable 2 milliGRAM(s) IV Push every 6 hours PRN Moderate Pain (4 - 6)  senna 2 Tablet(s) Oral at bedtime PRN Constipation      REVIEW OF SYSTEMS:  CONSTITUTIONAL: +Fever, chills. No generalized weakness, fatigue  EYES: No visual disturbances  ENMT:  No vertigo; No sinus or throat pain  NECK: No pain or stiffness  RESPIRATORY: No cough, wheezing; No shortness of breath, No LIVE  CARDIOVASCULAR: No chest pain, palpitations, dizziness, or leg swelling  GASTROINTESTINAL: No abdominal or epigastric pain. No nausea, vomiting, or hematemesis; No diarrhea or constipation. No melena or hematochezia  GENITOURINARY: No dysuria, frequency, hematuria, or incontinence  NEUROLOGICAL: No headaches, memory loss, loss of strength, numbness, or tremors  SKIN: + Swelling and erythema under left axilla and LUQ of left breast, spreading to chest, +Pain   MUSCULOSKELETAL: +Diffuse chronic joint pain. No muscle pain   PSYCHIATRIC: +Depression, anxiety     RADIOLOGY & ADDITIONAL TESTS:    < from: US Breast Limited, Left (19 @ 14:58) >  *** ADDENDUM 2019  ***    Underlying neoplasm in the left breast including possibility of   inflammatory carcinoma cannot be excluded      *** END OF ADDENDUM 2019  ***      PROCEDURE DATE:  2019          INTERPRETATION:  History: Redness left breast status post possible insect   bite.    Limited targeted ultrasound left breast area of interest.    There is extensive nonspecific subcutaneous edema lateral aspect of the   left breast region of interest which could represent cellulitis. No   discrete localized fluid collection to suggest abscess at this time.  Consider further evaluation to include an mammography and breast MR as   well as  surgical consultation. This report should not deter biopsy of a   clinically suspicious palpable abnormality.    Impression:    Extensive subcutaneous edema lateral left breast may represent   cellulitis. No discrete localized fluid collection.  See discussion above      ***Pleasesee the addendum at the top of this report. It may contain   additional important information or changes.****    < end of copied text >      Imaging Personally Reviewed:  [ x ] YES  [ ] NO    Consultant(s) Notes Reviewed:  [ x ] YES  [ ] NO    PHYSICAL EXAM:  GENERAL: NAD, well-groomed, well-developed, obese  HEAD:  Atraumatic, Normocephalic  EYES: EOMI, PERRLA, conjunctiva and sclera clear  ENMT: No erythema or enlargement; Moist mucous membranes, Good dentition, No lesions  NECK: Supple, No JVD   NERVOUS SYSTEM:  Alert & Oriented X3, Good concentration; Motor Strength 5/5 B/L upper and lower extremities, no obvious sensory deficits   CHEST/LUNG: Clear to ascultation bilaterally; No rales, rhonchi, wheezing  HEART: Regular rate and rhythm; No murmurs, rubs, or gallops  ABDOMEN: Soft, Nontender, Nondistended; Bowel sounds present  EXTREMITIES:  2+ Peripheral Pulses, No clubbing, cyanosis, or edema, +Pain with ROM of left arm   LYMPH: No lymphadenopathy noted  SKIN: + Erythema, swelling under left axilla extending to LUQ of left breast, tenderness to palpation, warm to touch, black speckle noted, no drainage noted        Care Discussed with Consultants/Other Providers [ x ] YES  [ ] NO

## 2019-11-08 DIAGNOSIS — N63.20 UNSPECIFIED LUMP IN THE LEFT BREAST, UNSPECIFIED QUADRANT: ICD-10-CM

## 2019-11-08 LAB
ANION GAP SERPL CALC-SCNC: 5 MMOL/L — SIGNIFICANT CHANGE UP (ref 5–17)
BUN SERPL-MCNC: 14 MG/DL — SIGNIFICANT CHANGE UP (ref 7–23)
C DIFF BY PCR RESULT: SIGNIFICANT CHANGE UP
C DIFF TOX GENS STL QL NAA+PROBE: SIGNIFICANT CHANGE UP
CALCIUM SERPL-MCNC: 8.5 MG/DL — SIGNIFICANT CHANGE UP (ref 8.5–10.1)
CHLORIDE SERPL-SCNC: 106 MMOL/L — SIGNIFICANT CHANGE UP (ref 96–108)
CO2 SERPL-SCNC: 30 MMOL/L — SIGNIFICANT CHANGE UP (ref 22–31)
CREAT SERPL-MCNC: 0.81 MG/DL — SIGNIFICANT CHANGE UP (ref 0.5–1.3)
GLUCOSE SERPL-MCNC: 104 MG/DL — HIGH (ref 70–99)
HCT VFR BLD CALC: 39.5 % — SIGNIFICANT CHANGE UP (ref 34.5–45)
HGB BLD-MCNC: 13 G/DL — SIGNIFICANT CHANGE UP (ref 11.5–15.5)
INR BLD: 2.39 RATIO — HIGH (ref 0.88–1.16)
MCHC RBC-ENTMCNC: 31.1 PG — SIGNIFICANT CHANGE UP (ref 27–34)
MCHC RBC-ENTMCNC: 32.9 GM/DL — SIGNIFICANT CHANGE UP (ref 32–36)
MCV RBC AUTO: 94.5 FL — SIGNIFICANT CHANGE UP (ref 80–100)
NRBC # BLD: 0 /100 WBCS — SIGNIFICANT CHANGE UP (ref 0–0)
PLATELET # BLD AUTO: 200 K/UL — SIGNIFICANT CHANGE UP (ref 150–400)
POTASSIUM SERPL-MCNC: 3.6 MMOL/L — SIGNIFICANT CHANGE UP (ref 3.5–5.3)
POTASSIUM SERPL-SCNC: 3.6 MMOL/L — SIGNIFICANT CHANGE UP (ref 3.5–5.3)
PROTHROM AB SERPL-ACNC: 27.8 SEC — HIGH (ref 10–12.9)
RBC # BLD: 4.18 M/UL — SIGNIFICANT CHANGE UP (ref 3.8–5.2)
RBC # FLD: 13.7 % — SIGNIFICANT CHANGE UP (ref 10.3–14.5)
SODIUM SERPL-SCNC: 141 MMOL/L — SIGNIFICANT CHANGE UP (ref 135–145)
VANCOMYCIN TROUGH SERPL-MCNC: 6.1 UG/ML — LOW (ref 10–20)
WBC # BLD: 9.51 K/UL — SIGNIFICANT CHANGE UP (ref 3.8–10.5)
WBC # FLD AUTO: 9.51 K/UL — SIGNIFICANT CHANGE UP (ref 3.8–10.5)

## 2019-11-08 PROCEDURE — 99232 SBSQ HOSP IP/OBS MODERATE 35: CPT

## 2019-11-08 RX ADMIN — Medication 10 MILLIGRAM(S): at 12:40

## 2019-11-08 RX ADMIN — Medication 200 MILLIGRAM(S): at 17:40

## 2019-11-08 RX ADMIN — Medication 10 MILLIGRAM(S): at 06:40

## 2019-11-08 RX ADMIN — Medication 1 TABLET(S): at 12:40

## 2019-11-08 RX ADMIN — Medication 1 TABLET(S): at 06:39

## 2019-11-08 RX ADMIN — Medication 200 MILLIGRAM(S): at 06:40

## 2019-11-08 RX ADMIN — LOSARTAN POTASSIUM 50 MILLIGRAM(S): 100 TABLET, FILM COATED ORAL at 06:40

## 2019-11-08 RX ADMIN — Medication 10 MILLIGRAM(S): at 21:40

## 2019-11-08 RX ADMIN — CEFTRIAXONE 100 MILLIGRAM(S): 500 INJECTION, POWDER, FOR SOLUTION INTRAMUSCULAR; INTRAVENOUS at 12:41

## 2019-11-08 RX ADMIN — Medication 1 TABLET(S): at 06:40

## 2019-11-08 RX ADMIN — ESCITALOPRAM OXALATE 20 MILLIGRAM(S): 10 TABLET, FILM COATED ORAL at 12:41

## 2019-11-08 RX ADMIN — SIMVASTATIN 40 MILLIGRAM(S): 20 TABLET, FILM COATED ORAL at 21:40

## 2019-11-08 RX ADMIN — Medication 1 TABLET(S): at 21:40

## 2019-11-08 RX ADMIN — Medication 25 MILLIGRAM(S): at 06:40

## 2019-11-08 RX ADMIN — Medication 166.67 MILLIGRAM(S): at 06:39

## 2019-11-08 RX ADMIN — Medication 166.67 MILLIGRAM(S): at 17:40

## 2019-11-08 RX ADMIN — Medication 2000 UNIT(S): at 12:40

## 2019-11-08 RX ADMIN — Medication 25 MILLIGRAM(S): at 17:40

## 2019-11-08 NOTE — PROGRESS NOTE ADULT - PROBLEM SELECTOR PLAN 5
Chronic  - Continue home statin qhs  - Lipid panel (11/7/19) negative Chronic, stable   - BP today-123/70  - Continue home BP meds with hold parameters (Losartan)

## 2019-11-08 NOTE — PROGRESS NOTE ADULT - SUBJECTIVE AND OBJECTIVE BOX
Patient is a 54y old  Female who presents with a chief complaint of Cellulitis, sepsis (2019 12:34)      INTERVAL HPI/OVERNIGHT EVENTS:      T(C): 37.2 (19 @ 05:32), Max: 37.3 (19 @ 20:30)  HR: 78 (19 @ 05:32) (78 - 85)  BP: 123/70 (19 @ 05:32) (112/75 - 123/70)  RR: 17 (19 @ 05:32) (17 - 17)  SpO2: 94% (19 @ 05:32) (94% - 96%)  Wt(kg): --  I&O's Summary    2019 07:01  -  2019 07:00  --------------------------------------------------------  IN: 600 mL / OUT: 0 mL / NET: 600 mL        LABS:                        13.0   9.51  )-----------( 200      ( 2019 06:39 )             39.5     11-    141  |  106  |  14  ----------------------------<  104<H>  3.6   |  30  |  0.81    Ca    8.5      2019 06:39    TPro  7.3  /  Alb  3.3  /  TBili  1.2  /  DBili  x   /  AST  20  /  ALT  29  /  AlkPhos  55  11-07    PT/INR - ( 2019 06:39 )   PT: 27.8 sec;   INR: 2.39 ratio         PTT - ( 2019 14:48 )  PTT:36.9 sec  Urinalysis Basic - ( 2019 17:33 )    Color: Yellow / Appearance: Slightly Turbid / S.030 / pH: x  Gluc: x / Ketone: Trace  / Bili: Small / Urobili: 1   Blood: x / Protein: 25 mg/dL / Nitrite: Negative   Leuk Esterase: Trace / RBC: 0-2 /HPF / WBC 0-2   Sq Epi: x / Non Sq Epi: Occasional / Bacteria: Occasional      CAPILLARY BLOOD GLUCOSE      POCT Blood Glucose.: 111 mg/dL (2019 07:44)  POCT Blood Glucose.: 161 mg/dL (2019 21:34)  POCT Blood Glucose.: 98 mg/dL (2019 16:51)  POCT Blood Glucose.: 92 mg/dL (2019 11:31)        Urinalysis Basic - ( 2019 17:33 )    Color: Yellow / Appearance: Slightly Turbid / S.030 / pH: x  Gluc: x / Ketone: Trace  / Bili: Small / Urobili: 1   Blood: x / Protein: 25 mg/dL / Nitrite: Negative   Leuk Esterase: Trace / RBC: 0-2 /HPF / WBC 0-2   Sq Epi: x / Non Sq Epi: Occasional / Bacteria: Occasional        MEDICATIONS  (STANDING):  baclofen 10 milliGRAM(s) Oral three times a day  cefTRIAXone   IVPB 1000 milliGRAM(s) IV Intermittent every 24 hours  cholecalciferol 2000 Unit(s) Oral daily  dextrose 5%. 1000 milliLiter(s) (50 mL/Hr) IV Continuous <Continuous>  dextrose 50% Injectable 12.5 Gram(s) IV Push once  escitalopram 20 milliGRAM(s) Oral daily  hydroxychloroquine 200 milliGRAM(s) Oral two times a day  insulin lispro (HumaLOG) corrective regimen sliding scale   SubCutaneous three times a day before meals  lactobacillus acidophilus 1 Tablet(s) Oral three times a day  losartan 50 milliGRAM(s) Oral daily  multivitamin 1 Tablet(s) Oral daily  simvastatin 40 milliGRAM(s) Oral at bedtime  topiramate 25 milliGRAM(s) Oral two times a day  triamterene 37.5 mG/hydrochlorothiazide 25 mG Tablet 1 Tablet(s) Oral daily  vancomycin  IVPB 1250 milliGRAM(s) IV Intermittent two times a day  vancomycin  IVPB        MEDICATIONS  (PRN):  acetaminophen   Tablet .. 650 milliGRAM(s) Oral every 6 hours PRN Mild Pain (1 - 3)  acetaminophen   Tablet .. 650 milliGRAM(s) Oral every 6 hours PRN Temp greater or equal to 38C (100.4F)  dextrose 40% Gel 15 Gram(s) Oral once PRN Blood Glucose LESS THAN 70 milliGRAM(s)/deciliter  glucagon  Injectable 1 milliGRAM(s) IntraMuscular once PRN Glucose LESS THAN 70 milligrams/deciliter  morphine  - Injectable 2 milliGRAM(s) IV Push every 6 hours PRN Moderate Pain (4 - 6)  senna 2 Tablet(s) Oral at bedtime PRN Constipation      REVIEW OF SYSTEMS:  CONSTITUTIONAL: No fever, weight loss, or fatigue  EYES: No eye pain, visual disturbances, or discharge  ENMT:  No difficulty hearing, tinnitus, vertigo; No sinus or throat pain  NECK: No pain or stiffness  RESPIRATORY: No cough, wheezing, chills or hemoptysis; No shortness of breath  CARDIOVASCULAR: No chest pain, palpitations, dizziness, or leg swelling  GASTROINTESTINAL: No abdominal or epigastric pain. No nausea, vomiting, or hematemesis; No diarrhea or constipation. No melena or hematochezia.  GENITOURINARY: No dysuria, frequency, hematuria, or incontinence  NEUROLOGICAL: No headaches, memory loss, loss of strength, numbness, or tremors  SKIN: No itching, burning, rashes, or lesions   LYMPH NODES: No enlarged glands  ENDOCRINE: No heat or cold intolerance; No hair loss  MUSCULOSKELETAL: No joint pain or swelling; No muscle, back, or extremity pain  PSYCHIATRIC: No depression, anxiety, mood swings, or difficulty sleeping  HEME/LYMPH: No easy bruising, or bleeding gums  ALLERY AND IMMUNOLOGIC: No hives or eczema    RADIOLOGY & ADDITIONAL TESTS:    Imaging Personally Reviewed:  [ ] YES  [ ] NO    Consultant(s) Notes Reviewed:  [ ] YES  [ ] NO    PHYSICAL EXAM:  GENERAL: NAD, well-groomed, well-developed  HEAD:  Atraumatic, Normocephalic  EYES: EOMI, PERRLA, conjunctiva and sclera clear  ENMT: No tonsillar erythema, exudates, or enlargement; Moist mucous membranes, Good dentition, No lesions  NECK: Supple, No JVD, Normal thyroid  NERVOUS SYSTEM:  Alert & Oriented X3, Good concentration; Motor Strength 5/5 B/L upper and lower extremities; DTRs 2+ intact and symmetric  CHEST/LUNG: Clear to percussion bilaterally; No rales, rhonchi, wheezing, or rubs  HEART: Regular rate and rhythm; No murmurs, rubs, or gallops  ABDOMEN: Soft, Nontender, Nondistended; Bowel sounds present  EXTREMITIES:  2+ Peripheral Pulses, No clubbing, cyanosis, or edema  LYMPH: No lymphadenopathy noted  SKIN: No rashes or lesions    Care Discussed with Consultants/Other Providers [ ] YES  [ ] NO Patient is a 54y old  Female who presents with a chief complaint of Cellulitis, sepsis (2019 12:34)      INTERVAL HPI/OVERNIGHT EVENTS:  19: Pt was seen and examined at bedside. Pt states that her sx have been improving. The erythema under her left axilla spreading to her left breast has improved, less red now and has not spread. The lump is still present, she denies any changes in size or hardness. Pt reports that her pain has improved as well, says that it feels more like a "sunburn" now.  She avoids moving her left arm much due pain she from the stretching of the skin. Pt had watery diarrhea x5 last night and states that it is probably due to stress, she went to the bathroom soon after eating each time. Says that she sometimes has these sx due to stress. Pt had a BM this morning, stool is more soft instead of watery. Pt denies any blood in the stool, just blood on the toilet paper when she wipes due to her hemorrhoids which is normal for her. Pt has been tolerating her diet. No other complaints currently. Pt denies any fever, chills, body aches, chest pain, SOB, palpitations, abdominal pain, N/V/C, dysuria, urinary urgency/frequency, numbness/tingling.   Overnight events: Pt had watery diarrhea x5, no blood or dark stool reported. C.diff PCR and Bacid was ordered. Contact precautions for now.       T(C): 37.2 (19 @ 05:32), Max: 37.3 (19 @ 20:30)  HR: 78 (19 @ 05:32) (78 - 85)  BP: 123/70 (19 @ 05:32) (112/75 - 123/70)  RR: 17 (19 @ 05:32) (17 - 17)  SpO2: 94% (19 @ 05:32) (94% - 96%)  Wt(kg): --  I&O's Summary    2019 07:01  -  2019 07:00  --------------------------------------------------------  IN: 600 mL / OUT: 0 mL / NET: 600 mL        LABS:                        13.0   9.51  )-----------( 200      ( 2019 06:39 )             39.5         141  |  106  |  14  ----------------------------<  104<H>  3.6   |  30  |  0.81    Ca    8.5      2019 06:39    TPro  7.3  /  Alb  3.3  /  TBili  1.2  /  DBili  x   /  AST  20  /  ALT  29  /  AlkPhos  55  1107    PT/INR - ( 2019 06:39 )   PT: 27.8 sec;   INR: 2.39 ratio         PTT - ( 2019 14:48 )  PTT:36.9 sec  Urinalysis Basic - ( 2019 17:33 )    Color: Yellow / Appearance: Slightly Turbid / S.030 / pH: x  Gluc: x / Ketone: Trace  / Bili: Small / Urobili: 1   Blood: x / Protein: 25 mg/dL / Nitrite: Negative   Leuk Esterase: Trace / RBC: 0-2 /HPF / WBC 0-2   Sq Epi: x / Non Sq Epi: Occasional / Bacteria: Occasional      CAPILLARY BLOOD GLUCOSE      POCT Blood Glucose.: 111 mg/dL (2019 07:44)  POCT Blood Glucose.: 161 mg/dL (2019 21:34)  POCT Blood Glucose.: 98 mg/dL (2019 16:51)  POCT Blood Glucose.: 92 mg/dL (2019 11:31)        Urinalysis Basic - ( 2019 17:33 )    Color: Yellow / Appearance: Slightly Turbid / S.030 / pH: x  Gluc: x / Ketone: Trace  / Bili: Small / Urobili: 1   Blood: x / Protein: 25 mg/dL / Nitrite: Negative   Leuk Esterase: Trace / RBC: 0-2 /HPF / WBC 0-2   Sq Epi: x / Non Sq Epi: Occasional / Bacteria: Occasional        MEDICATIONS  (STANDING):  baclofen 10 milliGRAM(s) Oral three times a day  cefTRIAXone   IVPB 1000 milliGRAM(s) IV Intermittent every 24 hours  cholecalciferol 2000 Unit(s) Oral daily  dextrose 5%. 1000 milliLiter(s) (50 mL/Hr) IV Continuous <Continuous>  dextrose 50% Injectable 12.5 Gram(s) IV Push once  escitalopram 20 milliGRAM(s) Oral daily  hydroxychloroquine 200 milliGRAM(s) Oral two times a day  insulin lispro (HumaLOG) corrective regimen sliding scale   SubCutaneous three times a day before meals  lactobacillus acidophilus 1 Tablet(s) Oral three times a day  losartan 50 milliGRAM(s) Oral daily  multivitamin 1 Tablet(s) Oral daily  simvastatin 40 milliGRAM(s) Oral at bedtime  topiramate 25 milliGRAM(s) Oral two times a day  triamterene 37.5 mG/hydrochlorothiazide 25 mG Tablet 1 Tablet(s) Oral daily  vancomycin  IVPB 1250 milliGRAM(s) IV Intermittent two times a day  vancomycin  IVPB        MEDICATIONS  (PRN):  acetaminophen   Tablet .. 650 milliGRAM(s) Oral every 6 hours PRN Mild Pain (1 - 3)  acetaminophen   Tablet .. 650 milliGRAM(s) Oral every 6 hours PRN Temp greater or equal to 38C (100.4F)  dextrose 40% Gel 15 Gram(s) Oral once PRN Blood Glucose LESS THAN 70 milliGRAM(s)/deciliter  glucagon  Injectable 1 milliGRAM(s) IntraMuscular once PRN Glucose LESS THAN 70 milligrams/deciliter  morphine  - Injectable 2 milliGRAM(s) IV Push every 6 hours PRN Moderate Pain (4 - 6)  senna 2 Tablet(s) Oral at bedtime PRN Constipation      REVIEW OF SYSTEMS:  CONSTITUTIONAL: No fever, weight loss, or fatigue  EYES: No eye pain, visual disturbances, or discharge  ENMT:  No difficulty hearing, tinnitus, vertigo; No sinus or throat pain  NECK: No pain or stiffness  RESPIRATORY: No cough, wheezing, chills or hemoptysis; No shortness of breath  CARDIOVASCULAR: No chest pain, palpitations, dizziness, or leg swelling  GASTROINTESTINAL: + Diarrhea, No abdominal or epigastric pain. No nausea, vomiting, or hematemesis; No constipation. No melena or hematochezia.  GENITOURINARY: No dysuria, frequency, hematuria, or incontinence  NEUROLOGICAL: No headaches, memory loss, loss of strength, numbness, or tremors  SKIN: + Erythema under the left axilla spreading to left breast, +lump present   LYMPH NODES: No enlarged glands  ENDOCRINE: No heat or cold intolerance; No hair loss  MUSCULOSKELETAL: No joint pain or swelling; No muscle, back, or extremity pain  PSYCHIATRIC: No depression, anxiety    RADIOLOGY & ADDITIONAL TESTS:    Imaging Personally Reviewed:  [x] YES  [ ] NO    Consultant(s) Notes Reviewed:  [x] YES  [ ] NO    PHYSICAL EXAM:  GENERAL: NAD, well-groomed, well-developed  HEAD:  Atraumatic, Normocephalic  EYES: EOMI, PERRLA, conjunctiva and sclera clear  ENMT: No tonsillar erythema, exudates, or enlargement; Moist mucous membranes, Good dentition, No lesions  NECK: Supple  NERVOUS SYSTEM:  Alert & Oriented X3, Good concentration; Motor Strength 5/5 B/L upper and lower extremities  CHEST/LUNG: Clear to percussion bilaterally; No rales, rhonchi, wheezing, or rubs  HEART: Regular rate and rhythm; No murmurs, rubs, or gallops  ABDOMEN: Soft, Nontender, Nondistended; Bowel sounds present, obese  EXTREMITIES:  2+ Peripheral Pulses, No clubbing, cyanosis, or edema  LYMPH: No lymphadenopathy noted  SKIN: +Erythema under the left axilla and left breast spreading to her left back, lump felt on the lateral aspect of left breast, tenderness to palpation, warm to touch    Care Discussed with Consultants/Other Providers [x] YES  [ ] NO

## 2019-11-08 NOTE — PROGRESS NOTE ADULT - ASSESSMENT
54 y/o female w/ PMHx HTN, HLD, TIA, SLE, sarcoidosis, anticardiolipin Ab (on Coumadin), pre-diabetes, MYRTLE, presents to the ED c/o left upper breast/axilla pain x 2 days that did not respond to outpt Bactrim Rx

## 2019-11-08 NOTE — PROGRESS NOTE ADULT - PROBLEM SELECTOR PLAN 2
Continue A/C with pt home Coumadin  - F/u AM PT/INR, Today 27.8/2.38  - Cardio consulted, Dr. Delgadillo, recs appreciated Continue A/C with pt home Coumadin, continue on coumadin 5mg in the AM, 2mg at bedtime  - F/u AM PT/INR, Today 27.8/2.38  - Cardio consulted, Dr. Delgadillo, recs appreciated Addendum on breast ultrasound noted- Underlying neoplasm in the left breast including possibility of inflammatory carcinoma cannot be excluded  -Patient to be instructed to have mammogram done as outpatient

## 2019-11-08 NOTE — PROGRESS NOTE ADULT - PROBLEM SELECTOR PLAN 3
Chronic  - Continue home Plaquenil, Topamax Continue A/C with pt home Coumadin, continue on coumadin 5mg in the AM, 2mg at bedtime  - F/u AM PT/INR, Today 27.8/2.38  - Cardio consulted, Dr. Delgadillo, recs appreciated

## 2019-11-08 NOTE — PROGRESS NOTE ADULT - SUBJECTIVE AND OBJECTIVE BOX
[facility] -- Jae Valdez, Isaak Lepe Pannella, Patel, Savella  Office # [phone]      Follow Up:    Anticardiolipin antibody syndrome & AC  Subjective/Observations:   No events overnight resting comfortably in bed.  No complaints of chest pain, dyspnea, or palpitations reported. No signs of orthopnea or PND.     REVIEW OF SYSTEMS: All other review of systems is negative unless indicated above    PAST MEDICAL & SURGICAL HISTORY:  Obstructive sleep apnea  Sarcoidosis  Pre-diabetes  Anticardiolipin antibody syndrome  Lupus  Hypertension  Hyperlipidemia  H/O  section      MEDICATIONS  (STANDING):  baclofen 10 milliGRAM(s) Oral three times a day  cefTRIAXone   IVPB 1000 milliGRAM(s) IV Intermittent every 24 hours  cholecalciferol 2000 Unit(s) Oral daily  dextrose 5%. 1000 milliLiter(s) (50 mL/Hr) IV Continuous <Continuous>  dextrose 50% Injectable 12.5 Gram(s) IV Push once  escitalopram 20 milliGRAM(s) Oral daily  hydroxychloroquine 200 milliGRAM(s) Oral two times a day  insulin lispro (HumaLOG) corrective regimen sliding scale   SubCutaneous three times a day before meals  lactobacillus acidophilus 1 Tablet(s) Oral three times a day  losartan 50 milliGRAM(s) Oral daily  multivitamin 1 Tablet(s) Oral daily  simvastatin 40 milliGRAM(s) Oral at bedtime  topiramate 25 milliGRAM(s) Oral two times a day  triamterene 37.5 mG/hydrochlorothiazide 25 mG Tablet 1 Tablet(s) Oral daily  vancomycin  IVPB 1250 milliGRAM(s) IV Intermittent two times a day  vancomycin  IVPB        MEDICATIONS  (PRN):  acetaminophen   Tablet .. 650 milliGRAM(s) Oral every 6 hours PRN Mild Pain (1 - 3)  acetaminophen   Tablet .. 650 milliGRAM(s) Oral every 6 hours PRN Temp greater or equal to 38C (100.4F)  dextrose 40% Gel 15 Gram(s) Oral once PRN Blood Glucose LESS THAN 70 milliGRAM(s)/deciliter  glucagon  Injectable 1 milliGRAM(s) IntraMuscular once PRN Glucose LESS THAN 70 milligrams/deciliter  morphine  - Injectable 2 milliGRAM(s) IV Push every 6 hours PRN Moderate Pain (4 - 6)  senna 2 Tablet(s) Oral at bedtime PRN Constipation      Allergies    Paxil (Rash)  penicillin (Rash)    Intolerances        Vital Signs Last 24 Hrs  T(C): 37.2 (2019 05:32), Max: 37.3 (2019 20:30)  T(F): 99 (2019 05:32), Max: 99.1 (2019 20:30)  HR: 78 (2019 05:32) (78 - 85)  BP: 123/70 (2019 05:32) (112/75 - 123/70)  BP(mean): --  RR: 17 (2019 05:32) (17 - 17)  SpO2: 94% (2019 05:32) (94% - 96%)    I&O's Summary    2019 07:01  -  2019 07:00  --------------------------------------------------------  IN: 600 mL / OUT: 0 mL / NET: 600 mL          PHYSICAL EXAM:  TELE: Off tele   Constitutional: NAD, awake and alert, well-developed  HEENT: Moist Mucous Membranes, Anicteric  Pulmonary: Non-labored, breath sounds are clear bilaterally, No wheezing, crackles or rhonchi  Cardiovascular: Regular, S1 and S2 nl, No murmurs, rubs, gallops or clicks  Gastrointestinal: Bowel Sounds present, soft, nontender.   Lymph: No lymphadenopathy. No peripheral edema.  Skin: No visible rashes or ulcers.  Psych:  Mood & affect appropriate    LABS: All Labs Reviewed:                        13.0   9.51  )-----------( 200      ( 2019 06:39 )             39.5                         13.6   14.43 )-----------( 204      ( 2019 07:53 )             41.2                         14.7   16.03 )-----------( 218      ( 2019 14:48 )             43.0     2019 06:39    141    |  106    |  14     ----------------------------<  104    3.6     |  30     |  0.81   2019 07:53    138    |  103    |  12     ----------------------------<  128    3.4     |  30     |  0.83   2019 14:48    138    |  104    |  16     ----------------------------<  121    3.3     |  27     |  0.98     Ca    8.5        2019 06:39  Ca    8.7        2019 07:53  Ca    9.0        2019 14:48    TPro  7.3    /  Alb  3.3    /  TBili  1.2    /  DBili  x      /  AST  20     /  ALT  29     /  AlkPhos  55     2019 07:53  TPro  7.6    /  Alb  3.6    /  TBili  0.7    /  DBili  x      /  AST  30     /  ALT  31     /  AlkPhos  57     2019 14:48    PT/INR - ( 2019 06:39 )   PT: 27.8 sec;   INR: 2.39 ratio         PTT - ( 2019 14:48 )  PTT:36.9 sec         ECG:  < from: 12 Lead ECG (19 @ 09:20) >  Ventricular Rate 74 BPM    Atrial Rate 74 BPM    P-R Interval 180 ms    QRS Duration 126 ms    Q-T Interval 404 ms    QTC Calculation(Bezet) 448 ms    P Axis 52 degrees    R Axis -2 degrees    T Axis 27 degrees    Diagnosis Line Normal sinus rhythm  Nonspecific intraventricular block  Abnormal ECG  When compared with ECG of 2019 15:46, (Unconfirmed)  No significant change was found  Confirmed by Roberth WISDOM, Dejuan ([id number]) on 2019 12:38:51 PM    < end of copied text >    Echo:    Radiology:

## 2019-11-08 NOTE — PROGRESS NOTE ADULT - PROBLEM SELECTOR PLAN 7
Chronic  - Per pt, sees cardiologist occasionally, had a echo done ~2 yrs ago, unsure of results   - Cardio, Dr. Delgadillo following, recs appreciated Per pt - pre-diabetic, on Metformin at home   - Hold Metformin   - Low dose ISS, accu checks, hypoglycemic protocol   - A1C 11/7/19-5.4

## 2019-11-08 NOTE — PROGRESS NOTE ADULT - PROBLEM SELECTOR PLAN 1
W/ sepsis likely 2/2 insect bite vs folliculitis, lactate nl   - C/w IV Vanco and Ceftriaxone  - F/u Vanco troughs??   - ID, Dr. Martel, consulted, recs appreciated   - Follow for possible abscess formation and potential I+D, consider surgery consult if no improvement, f/u ID recs    - Gyn consulted given addendum read of breast u/s - feels it is more of a surgery consult   - Tylenol PRN for fever   - Pt received Tylenol 650mg PO in the ED - states pain is well controlled with that. It is  when palpated, improved from yesterday   - Tylenol 650mg PO q6h for mild pain, Morphine IV 2mg q6h PRN for moderate pain   - Senna PRN for constipation if pt using moderate pain scale   - Pt is afebrile, WBC trending down 14.43-->9.51  - F/u AM labs, trend temps, white count in AM

## 2019-11-08 NOTE — PROGRESS NOTE ADULT - PROBLEM SELECTOR PLAN 9
IMPROVE VTE Individual Risk Assessment        RISK                                                          Points  [  ] Previous VTE                                                3  [ x ] Thrombophilia                                             2  [  ] Lower limb paralysis                                    2        (unable to hold up >15 seconds)    [  ] Current Cancer                                             2         (within 6 months)  [  ] Immobilization > 24 hrs                              1  [  ] ICU/CCU stay > 24 hours                            1  [  ] Age > 60                                                    1  IMPROVE VTE Score _________ 2 - Continue pt home Coumadin 7mg PO qd for DVT ppx Chronic   - Pt brought home CPAP machine

## 2019-11-08 NOTE — PROGRESS NOTE ADULT - ASSESSMENT
54 y/o female w/ PMHx HTN, HLD, TIA, SLE, sarcoidosis, anticardiolipin Ab (on Coumadin) presents to the ED complaining of left chest and axilla redness worsening over the last 2 days. Likely cellulitis. Consulted for anticoagulation recommendation,    anticardiolipin Ab:   stable, on coumadin  continue on coumadin 5mg in the AM, 2mg at bedtime   continue to trend INR/PT  INR=2.39    tachycardia:  controlled per flowsheet   reactive  likely 2/2 sepsis, fever, axillary pain    HTN  - Controlled  - CW losartan    HLD  - CW statin   - monitor routine hemodynamics.   to follow closely with you while admitted    Dejuan Albarado [facility]  Cardiology   Spectra #[phone] 54 y/o female w/ PMHx HTN, HLD, TIA, SLE, sarcoidosis, anticardiolipin Ab (on Coumadin) presents to the ED complaining of left chest and axilla redness worsening over the last 2 days. Likely cellulitis. Consulted for anticoagulation recommendation,    anticardiolipin Ab:   stable, on coumadin  continue on coumadin 5mg in the AM, 2mg at bedtime   continue to trend INR/PT  INR=2.39    tachycardia:  controlled per flowsheet   reactive  likely 2/2 sepsis, fever, axillary pain    HTN  - Controlled  - CW losartan    HLD  - CW statin   - monitor routine hemodynamics.     to follow closely with you while admitted    Dejuan Albarado [facility]  Cardiology   Spectra #[phone]

## 2019-11-08 NOTE — PROGRESS NOTE ADULT - PROBLEM SELECTOR PLAN 4
Chronic, stable   - BP today-123/70  - Continue home BP meds with hold parameters (Losartan) Chronic  - Continue home Plaquenil, Topamax

## 2019-11-08 NOTE — PROGRESS NOTE ADULT - PROBLEM SELECTOR PLAN 8
Chronic   - Pt brought home CPAP machine Chronic  - Per pt, sees cardiologist occasionally, had a echo done ~2 yrs ago, unsure of results   - Cardio, Dr. Delgadillo following, recs appreciated

## 2019-11-08 NOTE — PROGRESS NOTE ADULT - SUBJECTIVE AND OBJECTIVE BOX
[facility], Division of Infectious Diseases  ASHLYN Byrd A. Lee  [phone]    Name: LOIS ROBERTS  Age: 54y  Gender: Female  MRN: [id number]    Interval History--  Notes reviewed. Feeling better. No new issues. No fevers, chills, or rigors. Pain less.     Past Medical History--  Obstructive sleep apnea  Sarcoidosis  Pre-diabetes  Anticardiolipin antibody syndrome  Lupus  Hypertension  Hyperlipidemia  Clotting disorder  H/O  section  No significant past surgical history      For details regarding the patient's social history, family history, and other miscellaneous elements, please refer the initial infectious diseases consultation and/or the admitting history and physical examination for this admission.    Allergies    Paxil (Rash)  penicillin (Rash)    Intolerances        Medications--  Antibiotics:  cefTRIAXone   IVPB 1000 milliGRAM(s) IV Intermittent every 24 hours  hydroxychloroquine 200 milliGRAM(s) Oral two times a day  vancomycin  IVPB 1250 milliGRAM(s) IV Intermittent two times a day  vancomycin  IVPB        Immunologic:    Other:  acetaminophen   Tablet .. PRN  acetaminophen   Tablet .. PRN  baclofen  cholecalciferol  dextrose 40% Gel PRN  dextrose 5%.  dextrose 50% Injectable  escitalopram  glucagon  Injectable PRN  insulin lispro (HumaLOG) corrective regimen sliding scale  lactobacillus acidophilus  losartan  morphine  - Injectable PRN  multivitamin  senna PRN  simvastatin  topiramate  triamterene 37.5 mG/hydrochlorothiazide 25 mG Tablet      Review of Systems--  A 10-point review of systems was obtained.   Review of systems otherwise negative except as previously noted.    Physical Examination--  Vital Signs: T(F): 98.5 (19 @ 12:49), Max: 99.1 (19 @ 20:30)  HR: 76 (19 @ 12:49)  BP: 133/73 (19 @ 12:49)  RR: 17 (19 @ 12:49)  SpO2: 96% (19 @ 12:49)  Wt(kg): --  General: Nontoxic-appearing Female in no acute distress.  HEENT: AT/NC. Anicteric. Conjunctiva pink and moist. Oropharynx clear.  Neck: Not rigid. No sense of mass.  Nodes: None palpable.  Lungs: Clear bilaterally without rales, wheezing or rhonchi  Chest: extensive cellulitis L breast, mostly laterally, without fluctuance.  Heart: Regular rate and rhythm. No Murmur. No rub. No gallop. No palpable thrill.  Abdomen: Bowel sounds present and normoactive. Soft. Nondistended. Nontender. No sense of mass. No organomegaly.  Extremities: No cyanosis or clubbing. No edema.   Skin: Warm. Dry. Good turgor. No rash. No vasculitic stigmata.  Psychiatric: Appropriate affect and mood for situation.         Laboratory Studies--  CBC                        13.0   9.51  )-----------( 200      ( 2019 06:39 )             39.5       Chemistries      141  |  106  |  14  ----------------------------<  104<H>  3.6   |  30  |  0.81    Ca    8.5      2019 06:39    TPro  7.3  /  Alb  3.3  /  TBili  1.2  /  DBili  x   /  AST  20  /  ALT  29  /  AlkPhos  55  11-07      Culture Data    Culture - Blood (collected 2019 20:25)  Source: .Blood Blood  Preliminary Report (2019 21:01):    No growth to date.    Culture - Blood (collected 2019 20:25)  Source: .Blood Blood  Preliminary Report (2019 21:01):    No growth to date.

## 2019-11-09 LAB
ANION GAP SERPL CALC-SCNC: 6 MMOL/L — SIGNIFICANT CHANGE UP (ref 5–17)
BUN SERPL-MCNC: 13 MG/DL — SIGNIFICANT CHANGE UP (ref 7–23)
CALCIUM SERPL-MCNC: 9.2 MG/DL — SIGNIFICANT CHANGE UP (ref 8.5–10.1)
CHLORIDE SERPL-SCNC: 105 MMOL/L — SIGNIFICANT CHANGE UP (ref 96–108)
CO2 SERPL-SCNC: 29 MMOL/L — SIGNIFICANT CHANGE UP (ref 22–31)
CREAT SERPL-MCNC: 0.79 MG/DL — SIGNIFICANT CHANGE UP (ref 0.5–1.3)
GLUCOSE SERPL-MCNC: 113 MG/DL — HIGH (ref 70–99)
HCT VFR BLD CALC: 44.5 % — SIGNIFICANT CHANGE UP (ref 34.5–45)
HGB BLD-MCNC: 14.6 G/DL — SIGNIFICANT CHANGE UP (ref 11.5–15.5)
INR BLD: 1.99 RATIO — HIGH (ref 0.88–1.16)
MCHC RBC-ENTMCNC: 31.5 PG — SIGNIFICANT CHANGE UP (ref 27–34)
MCHC RBC-ENTMCNC: 32.8 GM/DL — SIGNIFICANT CHANGE UP (ref 32–36)
MCV RBC AUTO: 95.9 FL — SIGNIFICANT CHANGE UP (ref 80–100)
NRBC # BLD: 0 /100 WBCS — SIGNIFICANT CHANGE UP (ref 0–0)
PLATELET # BLD AUTO: 255 K/UL — SIGNIFICANT CHANGE UP (ref 150–400)
POTASSIUM SERPL-MCNC: 3.4 MMOL/L — LOW (ref 3.5–5.3)
POTASSIUM SERPL-SCNC: 3.4 MMOL/L — LOW (ref 3.5–5.3)
PROTHROM AB SERPL-ACNC: 23.2 SEC — HIGH (ref 10–12.9)
RBC # BLD: 4.64 M/UL — SIGNIFICANT CHANGE UP (ref 3.8–5.2)
RBC # FLD: 13.6 % — SIGNIFICANT CHANGE UP (ref 10.3–14.5)
SODIUM SERPL-SCNC: 140 MMOL/L — SIGNIFICANT CHANGE UP (ref 135–145)
WBC # BLD: 7.42 K/UL — SIGNIFICANT CHANGE UP (ref 3.8–10.5)
WBC # FLD AUTO: 7.42 K/UL — SIGNIFICANT CHANGE UP (ref 3.8–10.5)

## 2019-11-09 PROCEDURE — 99232 SBSQ HOSP IP/OBS MODERATE 35: CPT

## 2019-11-09 RX ORDER — WARFARIN SODIUM 2.5 MG/1
5 TABLET ORAL AT BEDTIME
Refills: 0 | Status: COMPLETED | OUTPATIENT
Start: 2019-11-09 | End: 2019-11-09

## 2019-11-09 RX ORDER — CEFTAROLINE FOSAMIL 600 MG/20ML
600 POWDER, FOR SOLUTION INTRAVENOUS EVERY 12 HOURS
Refills: 0 | Status: DISCONTINUED | OUTPATIENT
Start: 2019-11-09 | End: 2019-11-11

## 2019-11-09 RX ADMIN — Medication 1 TABLET(S): at 21:23

## 2019-11-09 RX ADMIN — Medication 25 MILLIGRAM(S): at 06:15

## 2019-11-09 RX ADMIN — Medication 200 MILLIGRAM(S): at 06:14

## 2019-11-09 RX ADMIN — CEFTRIAXONE 100 MILLIGRAM(S): 500 INJECTION, POWDER, FOR SOLUTION INTRAMUSCULAR; INTRAVENOUS at 12:25

## 2019-11-09 RX ADMIN — Medication 10 MILLIGRAM(S): at 06:15

## 2019-11-09 RX ADMIN — CEFTAROLINE FOSAMIL 50 MILLIGRAM(S): 600 POWDER, FOR SOLUTION INTRAVENOUS at 17:29

## 2019-11-09 RX ADMIN — Medication 2000 UNIT(S): at 12:25

## 2019-11-09 RX ADMIN — Medication 1 TABLET(S): at 12:26

## 2019-11-09 RX ADMIN — ESCITALOPRAM OXALATE 20 MILLIGRAM(S): 10 TABLET, FILM COATED ORAL at 12:26

## 2019-11-09 RX ADMIN — Medication 200 MILLIGRAM(S): at 17:30

## 2019-11-09 RX ADMIN — Medication 1 TABLET(S): at 12:25

## 2019-11-09 RX ADMIN — Medication 10 MILLIGRAM(S): at 21:23

## 2019-11-09 RX ADMIN — WARFARIN SODIUM 5 MILLIGRAM(S): 2.5 TABLET ORAL at 21:23

## 2019-11-09 RX ADMIN — LOSARTAN POTASSIUM 50 MILLIGRAM(S): 100 TABLET, FILM COATED ORAL at 06:14

## 2019-11-09 RX ADMIN — Medication 166.67 MILLIGRAM(S): at 06:14

## 2019-11-09 RX ADMIN — Medication 10 MILLIGRAM(S): at 12:26

## 2019-11-09 RX ADMIN — Medication 1 TABLET(S): at 06:14

## 2019-11-09 RX ADMIN — SIMVASTATIN 40 MILLIGRAM(S): 20 TABLET, FILM COATED ORAL at 21:23

## 2019-11-09 RX ADMIN — Medication 25 MILLIGRAM(S): at 17:30

## 2019-11-09 NOTE — PROGRESS NOTE ADULT - ASSESSMENT
52 y/o female w/ PMHx HTN, HLD, TIA, SLE, sarcoidosis, anticardiolipin Ab (on Coumadin) presents to the ED complaining of left chest and axilla redness worsening over the last 2 days. Likely cellulitis. Consulted for anticoagulation recommendation,    APLS  - Heme following  - Dose Coumadin daily to keep INR 2-3.  Monitor for s/s bleeding    Tachycardia  - Tachycradia was likely reactive to her infectious process.  Abx for cellulitis per ID  - No further evidence of tachycardia per flow sheet  - No need for AVN blocker at this time    HTN  - Controlled  - Continue Losartan (home dose)    HLD  - Continue statin     Continue to follow while admitted  All other w/u per Primary    Cassie Guan DNP, NP-C  Cardiology   Spectra #[phone]

## 2019-11-09 NOTE — PROGRESS NOTE ADULT - PROBLEM SELECTOR PLAN 2
Addendum on breast ultrasound noted- Underlying neoplasm in the left breast including possibility of inflammatory carcinoma cannot be excluded  -Patient to be instructed to have mammogram done as outpatient

## 2019-11-09 NOTE — PROGRESS NOTE ADULT - SUBJECTIVE AND OBJECTIVE BOX
Patient is a 54y old  Female who presents with a chief complaint of Cellulitis, sepsis (08 Nov 2019 12:52)  less pain    INTERVAL HPI/OVERNIGHT EVENTS:  T(C): 37.3 (11-09-19 @ 06:12), Max: 37.8 (11-08-19 @ 21:03)  HR: 76 (11-09-19 @ 06:12) (76 - 83)  BP: 143/81 (11-09-19 @ 06:12) (107/68 - 143/81)  RR: 16 (11-09-19 @ 06:12) (16 - 18)  SpO2: 97% (11-09-19 @ 06:12) (95% - 97%)  Wt(kg): --  I&O's Summary      LABS:                        13.0   9.51  )-----------( 200      ( 08 Nov 2019 06:39 )             39.5     11-08    141  |  106  |  14  ----------------------------<  104<H>  3.6   |  30  |  0.81    Ca    8.5      08 Nov 2019 06:39    TPro  7.3  /  Alb  3.3  /  TBili  1.2  /  DBili  x   /  AST  20  /  ALT  29  /  AlkPhos  55  11-07    PT/INR - ( 08 Nov 2019 06:39 )   PT: 27.8 sec;   INR: 2.39 ratio             CAPILLARY BLOOD GLUCOSE      POCT Blood Glucose.: 108 mg/dL (09 Nov 2019 07:20)  POCT Blood Glucose.: 98 mg/dL (08 Nov 2019 20:57)  POCT Blood Glucose.: 88 mg/dL (08 Nov 2019 16:56)  POCT Blood Glucose.: 91 mg/dL (08 Nov 2019 11:37)            MEDICATIONS  (STANDING):  baclofen 10 milliGRAM(s) Oral three times a day  cefTRIAXone   IVPB 1000 milliGRAM(s) IV Intermittent every 24 hours  cholecalciferol 2000 Unit(s) Oral daily  dextrose 5%. 1000 milliLiter(s) (50 mL/Hr) IV Continuous <Continuous>  dextrose 50% Injectable 12.5 Gram(s) IV Push once  escitalopram 20 milliGRAM(s) Oral daily  hydroxychloroquine 200 milliGRAM(s) Oral two times a day  insulin lispro (HumaLOG) corrective regimen sliding scale   SubCutaneous three times a day before meals  lactobacillus acidophilus 1 Tablet(s) Oral three times a day  losartan 50 milliGRAM(s) Oral daily  multivitamin 1 Tablet(s) Oral daily  simvastatin 40 milliGRAM(s) Oral at bedtime  topiramate 25 milliGRAM(s) Oral two times a day  triamterene 37.5 mG/hydrochlorothiazide 25 mG Tablet 1 Tablet(s) Oral daily  vancomycin  IVPB 1250 milliGRAM(s) IV Intermittent two times a day  vancomycin  IVPB        MEDICATIONS  (PRN):  acetaminophen   Tablet .. 650 milliGRAM(s) Oral every 6 hours PRN Mild Pain (1 - 3)  acetaminophen   Tablet .. 650 milliGRAM(s) Oral every 6 hours PRN Temp greater or equal to 38C (100.4F)  dextrose 40% Gel 15 Gram(s) Oral once PRN Blood Glucose LESS THAN 70 milliGRAM(s)/deciliter  glucagon  Injectable 1 milliGRAM(s) IntraMuscular once PRN Glucose LESS THAN 70 milligrams/deciliter  morphine  - Injectable 2 milliGRAM(s) IV Push every 6 hours PRN Moderate Pain (4 - 6)  senna 2 Tablet(s) Oral at bedtime PRN Constipation      REVIEW OF SYSTEMS:  CONSTITUTIONAL: No fever, weight loss, or fatigue  EYES: No eye pain, visual disturbances, or discharge  ENMT:  No difficulty hearing, tinnitus, vertigo; No sinus or throat pain  NECK: No pain or stiffness  RESPIRATORY: No cough, wheezing, chills or hemoptysis; No shortness of breath  CARDIOVASCULAR: No chest pain, palpitations, dizziness, or leg swelling  GASTROINTESTINAL: No abdominal or epigastric pain. No nausea, vomiting, or hematemesis; No diarrhea or constipation. No melena or hematochezia.  GENITOURINARY: No dysuria, frequency, hematuria, or incontinence  NEUROLOGICAL: No headaches, memory loss, loss of strength, numbness, or tremors  SKIN: No itching, burning, rashes, or lesions   LYMPH NODES: No enlarged glands  ENDOCRINE: No heat or cold intolerance; No hair loss  MUSCULOSKELETAL: No joint pain or swelling; No muscle, back, or extremity pain  PSYCHIATRIC: No depression, anxiety, mood swings, or difficulty sleeping  HEME/LYMPH: No easy bruising, or bleeding gums  ALLERY AND IMMUNOLOGIC: No hives or eczema    RADIOLOGY & ADDITIONAL TESTS:    Imaging Personally Reviewed:  [ ] YES  [ ] NO    Consultant(s) Notes Reviewed:  [ ] YES  [ ] NO    PHYSICAL EXAM:  GENERAL: NAD, well-groomed, well-developed  HEAD:  Atraumatic, Normocephalic  EYES: EOMI, PERRLA, conjunctiva and sclera clear  ENMT: No tonsillar erythema, exudates, or enlargement; Moist mucous membranes, Good dentition, No lesions  NECK: Supple, No JVD, Normal thyroid  NERVOUS SYSTEM:  Alert & Oriented X3, Good concentration; Motor Strength 5/5 B/L upper and lower extremities; DTRs 2+ intact and symmetric  CHEST/LUNG: Clear to percussion bilaterally; No rales, rhonchi, wheezing, or rubs  HEART: Regular rate and rhythm; No murmurs, rubs, or gallops  ABDOMEN: Soft, Nontender, Nondistended; Bowel sounds present  EXTREMITIES:  2+ Peripheral Pulses, No clubbing, cyanosis, or edema,   LYMPH: No lymphadenopathy noted  SKIN: No rashes or lesions, erythema and tenderness improving    Care Discussed with Consultants/Other Providers [ ] YES  [ ] NO

## 2019-11-09 NOTE — PROGRESS NOTE ADULT - ASSESSMENT
52 y/o female w/ PMHx HTN, HLD, TIA, SLE, sarcoidosis, anticardiolipin Ab (on Coumadin), pre-diabetes, MYRTLE, presents to the ED c/o left upper breast/axilla pain x 2 days that did not respond to outpt Bactrim Rx  Vanco level low, perhaps explaining suboptimal response to antibiotics.   I do not think the risk:benefit ratio of increasing the vancomycin here

## 2019-11-09 NOTE — PROGRESS NOTE ADULT - PROBLEM SELECTOR PLAN 7
Per pt - pre-diabetic, on Metformin at home   - Hold Metformin   - Low dose ISS, accu checks, hypoglycemic protocol   - A1C 11/7/19-5.4

## 2019-11-09 NOTE — PROGRESS NOTE ADULT - SUBJECTIVE AND OBJECTIVE BOX
[facility] Consultants -- Jae Valdez, Isaak Lepe, Silvio Delgadillo [place], [place]  Office # [phone]    Follow Up:      Subjective/Observations:     REVIEW OF SYSTEMS: All other review of systems is negative unless indicated above  PAST MEDICAL & SURGICAL HISTORY:  Obstructive sleep apnea  Sarcoidosis  Pre-diabetes  Anticardiolipin antibody syndrome  Lupus  Hypertension  Hyperlipidemia  H/O  section    MEDICATIONS  (STANDING):  baclofen 10 milliGRAM(s) Oral three times a day  cholecalciferol 2000 Unit(s) Oral daily  dextrose 5%. 1000 milliLiter(s) (50 mL/Hr) IV Continuous <Continuous>  dextrose 50% Injectable 12.5 Gram(s) IV Push once  escitalopram 20 milliGRAM(s) Oral daily  hydroxychloroquine 200 milliGRAM(s) Oral two times a day  insulin lispro (HumaLOG) corrective regimen sliding scale   SubCutaneous three times a day before meals  lactobacillus acidophilus 1 Tablet(s) Oral three times a day  losartan 50 milliGRAM(s) Oral daily  multivitamin 1 Tablet(s) Oral daily  simvastatin 40 milliGRAM(s) Oral at bedtime  topiramate 25 milliGRAM(s) Oral two times a day  triamterene 37.5 mG/hydrochlorothiazide 25 mG Tablet 1 Tablet(s) Oral daily  warfarin 5 milliGRAM(s) Oral at bedtime    MEDICATIONS  (PRN):  acetaminophen   Tablet .. 650 milliGRAM(s) Oral every 6 hours PRN Mild Pain (1 - 3)  acetaminophen   Tablet .. 650 milliGRAM(s) Oral every 6 hours PRN Temp greater or equal to 38C (100.4F)  dextrose 40% Gel 15 Gram(s) Oral once PRN Blood Glucose LESS THAN 70 milliGRAM(s)/deciliter  glucagon  Injectable 1 milliGRAM(s) IntraMuscular once PRN Glucose LESS THAN 70 milligrams/deciliter  morphine  - Injectable 2 milliGRAM(s) IV Push every 6 hours PRN Moderate Pain (4 - 6)  senna 2 Tablet(s) Oral at bedtime PRN Constipation    Allergies    Paxil (Rash)  penicillin (Rash)    Intolerances      Vital Signs Last 24 Hrs  T(C): 37.3 (2019 06:12), Max: 37.8 (2019 21:03)  T(F): 99.1 (2019 06:12), Max: 100.1 (2019 21:03)  HR: 76 (2019 06:12) (76 - 83)  BP: 143/81 (2019 06:12) (107/68 - 143/81)  BP(mean): --  RR: 16 (2019 06:12) (16 - 18)  SpO2: 97% (2019 06:12) (95% - 97%)  I&O's Summary    2019 07:01  -  2019 13:27  --------------------------------------------------------  IN: 240 mL / OUT: 0 mL / NET: 240 mL        PHYSICAL EXAM:  TELE:   Constitutional: NAD, awake and alert, well-developed  HEENT: Moist Mucous Membranes, Anicteric  Pulmonary: Non-labored, breath sounds are clear bilaterally, No wheezing, rales or rhonchi  Cardiovascular: Regular, S1 and S2, No murmurs, rubs, gallops or clicks  Gastrointestinal: Bowel Sounds present, soft, nontender.   Lymph: No peripheral edema. No lymphadenopathy.  Skin: No visible rashes or ulcers.  Psych:  Mood & affect appropriate  LABS: All Labs Reviewed:                        14.6   7.42  )-----------( 255      ( 2019 09:28 )             44.5                         13.0   9.51  )-----------( 200      ( 2019 06:39 )             39.5                         13.6   14.43 )-----------( 204      ( 2019 07:53 )             41.2     2019 09:28    140    |  105    |  13     ----------------------------<  113    3.4     |  29     |  0.79   2019 06:39    141    |  106    |  14     ----------------------------<  104    3.6     |  30     |  0.81   2019 07:53    138    |  103    |  12     ----------------------------<  128    3.4     |  30     |  0.83     Ca    9.2        2019 09:28  Ca    8.5        2019 06:39  Ca    8.7        2019 07:53    TPro  7.3    /  Alb  3.3    /  TBili  1.2    /  DBili  x      /  AST  20     /  ALT  29     /  AlkPhos  55     2019 07:53  TPro  7.6    /  Alb  3.6    /  TBili  0.7    /  DBili  x      /  AST  30     /  ALT  31     /  AlkPhos  57     2019 14:48    PT/INR - ( 2019 09:28 )   PT: 23.2 sec;   INR: 1.99 ratio                  Cassie Guan [facility]  Cardiology   Spectra #[phone] [facility] -- Jae Valdez [place], [place], Silvio Delgadillo [place]ella, [place]  Office # [phone]    Follow Up:  Tachycardia    Subjective/Observations: Sitting on the chair, on RA.  Denies any respiratory or cardiac discomfort.  Ambulates ad rob with no discomfort.  c/o pain on cellulitis site    REVIEW OF SYSTEMS: All other review of systems is negative unless indicated above  PAST MEDICAL & SURGICAL HISTORY:  Obstructive sleep apnea  Sarcoidosis  Pre-diabetes  Anticardiolipin antibody syndrome  Lupus  Hypertension  Hyperlipidemia  H/O  section    MEDICATIONS  (STANDING):  baclofen 10 milliGRAM(s) Oral three times a day  cholecalciferol 2000 Unit(s) Oral daily  dextrose 5%. 1000 milliLiter(s) (50 mL/Hr) IV Continuous <Continuous>  dextrose 50% Injectable 12.5 Gram(s) IV Push once  escitalopram 20 milliGRAM(s) Oral daily  hydroxychloroquine 200 milliGRAM(s) Oral two times a day  insulin lispro (HumaLOG) corrective regimen sliding scale   SubCutaneous three times a day before meals  lactobacillus acidophilus 1 Tablet(s) Oral three times a day  losartan 50 milliGRAM(s) Oral daily  multivitamin 1 Tablet(s) Oral daily  simvastatin 40 milliGRAM(s) Oral at bedtime  topiramate 25 milliGRAM(s) Oral two times a day  triamterene 37.5 mG/hydrochlorothiazide 25 mG Tablet 1 Tablet(s) Oral daily  warfarin 5 milliGRAM(s) Oral at bedtime    MEDICATIONS  (PRN):  acetaminophen   Tablet .. 650 milliGRAM(s) Oral every 6 hours PRN Mild Pain (1 - 3)  acetaminophen   Tablet .. 650 milliGRAM(s) Oral every 6 hours PRN Temp greater or equal to 38C (100.4F)  dextrose 40% Gel 15 Gram(s) Oral once PRN Blood Glucose LESS THAN 70 milliGRAM(s)/deciliter  glucagon  Injectable 1 milliGRAM(s) IntraMuscular once PRN Glucose LESS THAN 70 milligrams/deciliter  morphine  - Injectable 2 milliGRAM(s) IV Push every 6 hours PRN Moderate Pain (4 - 6)  senna 2 Tablet(s) Oral at bedtime PRN Constipation    Allergies    Paxil (Rash)  penicillin (Rash)    Intolerances    Vital Signs Last 24 Hrs  T(C): 37.3 (2019 06:12), Max: 37.8 (2019 21:03)  T(F): 99.1 (2019 06:12), Max: 100.1 (2019 21:03)  HR: 76 (2019 06:12) (76 - 83)  BP: 143/81 (2019 06:12) (107/68 - 143/81)  BP(mean): --  RR: 16 (2019 06:12) (16 - 18)  SpO2: 97% (2019 06:12) (95% - 97%)  I&O's Summary    2019 07:01  -  2019 13:27  --------------------------------------------------------  IN: 240 mL / OUT: 0 mL / NET: 240 mL     PHYSICAL EXAM:  TELE: Not on tele  Constitutional: NAD, awake and alert, morbidly obese  HEENT: Moist Mucous Membranes, Anicteric  Pulmonary: Non-labored, breath sounds are clear bilaterally, No wheezing, rales or rhonchi  Cardiovascular: Regular, S1 and S2, No murmurs, rubs, gallops or clicks  Gastrointestinal: Bowel Sounds present, soft, nontender.   Lymph: No peripheral edema. No lymphadenopathy.  Skin: No visible rashes or ulcers.  Erythema on left breast and back  Psych:  Mood & affect appropriate  LABS: All Labs Reviewed:                        14.6   7.42  )-----------( 255      ( 2019 09:28 )             44.5                         13.0   9.51  )-----------( 200      ( 2019 06:39 )             39.5                         13.6   14.43 )-----------( 204      ( 2019 07:53 )             41.2     2019 09:28    140    |  105    |  13     ----------------------------<  113    3.4     |  29     |  0.79   2019 06:39    141    |  106    |  14     ----------------------------<  104    3.6     |  30     |  0.81   2019 07:53    138    |  103    |  12     ----------------------------<  128    3.4     |  30     |  0.83     Ca    9.2        2019 09:28  Ca    8.5        2019 06:39  Ca    8.7        2019 07:53    TPro  7.3    /  Alb  3.3    /  TBili  1.2    /  DBili  x      /  AST  20     /  ALT  29     /  AlkPhos  55     2019 07:53  TPro  7.6    /  Alb  3.6    /  TBili  0.7    /  DBili  x      /  AST  30     /  ALT  31     /  AlkPhos  57     2019 14:48    PT/INR - ( 2019 09:28 )   PT: 23.2 sec;   INR: 1.99 ratio      < from: Xray Chest 2 Views PA/Lat (19 @ 15:27) >    EXAM:  XR CHEST PA LAT 2V                          PROCEDURE DATE:  2019      INTERPRETATION:  Admission, insect bite.    PA lateral. Prior 2019.    Heart and mediastinum normal.  Lungs clear.  Costophrenic angles sharp   bilaterally.    IMPRESSION: Normal chest    CATARINA SANCHEZ M.D., ATTENDING RADIOLOGIST  This document has been electronically signed. 2019  3:31PM      < end of copied text >    < from: 12 Lead ECG (19 @ 09:20) >    Ventricular Rate 74 BPM    Atrial Rate 74 BPM    P-R Interval 180 ms    QRS Duration 126 ms    Q-T Interval 404 ms    QTC Calculation(Bezet) 448 ms    P Axis 52 degrees    R Axis -2 degrees    T Axis 27 degrees    Diagnosis Line Normal sinus rhythm  Nonspecific intraventricular block  Abnormal ECG  When compared with ECG of 2019 15:46, (Unconfirmed)  No significant change was found  Confirmed by Roberth WISDOM, Dejuan ([id number]) on 2019 12:38:51 PM    < end of copied text >

## 2019-11-09 NOTE — PROGRESS NOTE ADULT - PROBLEM SELECTOR PLAN 3
Continue A/C with pt home Coumadin, continue on coumadin at bedtime (usually takes 5mg alt w 7mg qhs)  - F/u AM PT/INR, Today 27.8/2.38  - Cardio consulted, Dr. Delgadillo, recs appreciated

## 2019-11-10 LAB
INR BLD: 1.63 RATIO — HIGH (ref 0.88–1.16)
PROTHROM AB SERPL-ACNC: 18.7 SEC — HIGH (ref 10–12.9)

## 2019-11-10 PROCEDURE — 99232 SBSQ HOSP IP/OBS MODERATE 35: CPT

## 2019-11-10 PROCEDURE — 76642 ULTRASOUND BREAST LIMITED: CPT | Mod: 26,LT

## 2019-11-10 RX ORDER — POTASSIUM CHLORIDE 20 MEQ
20 PACKET (EA) ORAL
Refills: 0 | Status: COMPLETED | OUTPATIENT
Start: 2019-11-10 | End: 2019-11-10

## 2019-11-10 RX ORDER — WARFARIN SODIUM 2.5 MG/1
7 TABLET ORAL ONCE
Refills: 0 | Status: COMPLETED | OUTPATIENT
Start: 2019-11-10 | End: 2019-11-10

## 2019-11-10 RX ADMIN — Medication 10 MILLIGRAM(S): at 06:21

## 2019-11-10 RX ADMIN — Medication 20 MILLIEQUIVALENT(S): at 12:44

## 2019-11-10 RX ADMIN — CEFTAROLINE FOSAMIL 50 MILLIGRAM(S): 600 POWDER, FOR SOLUTION INTRAVENOUS at 06:20

## 2019-11-10 RX ADMIN — Medication 1 TABLET(S): at 12:45

## 2019-11-10 RX ADMIN — ESCITALOPRAM OXALATE 20 MILLIGRAM(S): 10 TABLET, FILM COATED ORAL at 12:46

## 2019-11-10 RX ADMIN — WARFARIN SODIUM 7 MILLIGRAM(S): 2.5 TABLET ORAL at 21:27

## 2019-11-10 RX ADMIN — SIMVASTATIN 40 MILLIGRAM(S): 20 TABLET, FILM COATED ORAL at 21:27

## 2019-11-10 RX ADMIN — Medication 1 TABLET(S): at 12:44

## 2019-11-10 RX ADMIN — Medication 20 MILLIEQUIVALENT(S): at 14:06

## 2019-11-10 RX ADMIN — Medication 2000 UNIT(S): at 12:44

## 2019-11-10 RX ADMIN — Medication 1 TABLET(S): at 21:27

## 2019-11-10 RX ADMIN — Medication 25 MILLIGRAM(S): at 17:27

## 2019-11-10 RX ADMIN — Medication 200 MILLIGRAM(S): at 06:21

## 2019-11-10 RX ADMIN — CEFTAROLINE FOSAMIL 50 MILLIGRAM(S): 600 POWDER, FOR SOLUTION INTRAVENOUS at 17:27

## 2019-11-10 RX ADMIN — Medication 10 MILLIGRAM(S): at 12:44

## 2019-11-10 RX ADMIN — Medication 1 TABLET(S): at 06:21

## 2019-11-10 RX ADMIN — Medication 200 MILLIGRAM(S): at 17:28

## 2019-11-10 RX ADMIN — Medication 25 MILLIGRAM(S): at 06:21

## 2019-11-10 RX ADMIN — Medication 10 MILLIGRAM(S): at 21:27

## 2019-11-10 NOTE — PROGRESS NOTE ADULT - PROBLEM SELECTOR PLAN 3
Continue A/C with pt home Coumadin, continue on coumadin at bedtime (usually takes 5mg alt w 7mg qhs)  - Cardio consulted, Dr. Delgadillo, recs appreciated

## 2019-11-10 NOTE — PROGRESS NOTE ADULT - SUBJECTIVE AND OBJECTIVE BOX
Patient is a 54y old  Female who presents with a chief complaint of Cellulitis, sepsis (09 Nov 2019 13:26)  complains of more localized area of tenderness       INTERVAL HPI/OVERNIGHT EVENTS:  T(C): 37 (11-10-19 @ 05:58), Max: 37.3 (11-09-19 @ 21:33)  HR: 75 (11-10-19 @ 05:58) (75 - 80)  BP: 106/67 (11-10-19 @ 05:58) (101/60 - 110/75)  RR: 16 (11-10-19 @ 05:58) (16 - 17)  SpO2: 95% (11-10-19 @ 05:58) (95% - 96%)  Wt(kg): --  I&O's Summary    09 Nov 2019 07:01  -  10 Nov 2019 07:00  --------------------------------------------------------  IN: 240 mL / OUT: 0 mL / NET: 240 mL        LABS:                        14.6   7.42  )-----------( 255      ( 09 Nov 2019 09:28 )             44.5     11-09    140  |  105  |  13  ----------------------------<  113<H>  3.4<L>   |  29  |  0.79    Ca    9.2      09 Nov 2019 09:28      PT/INR - ( 10 Nov 2019 10:20 )   PT: 18.7 sec;   INR: 1.63 ratio             CAPILLARY BLOOD GLUCOSE      POCT Blood Glucose.: 105 mg/dL (10 Nov 2019 07:34)  POCT Blood Glucose.: 105 mg/dL (09 Nov 2019 21:53)  POCT Blood Glucose.: 111 mg/dL (09 Nov 2019 16:40)  POCT Blood Glucose.: 102 mg/dL (09 Nov 2019 11:36)            MEDICATIONS  (STANDING):  baclofen 10 milliGRAM(s) Oral three times a day  ceftaroline fosamil IVPB 600 milliGRAM(s) IV Intermittent every 12 hours  cholecalciferol 2000 Unit(s) Oral daily  dextrose 5%. 1000 milliLiter(s) (50 mL/Hr) IV Continuous <Continuous>  dextrose 50% Injectable 12.5 Gram(s) IV Push once  escitalopram 20 milliGRAM(s) Oral daily  hydroxychloroquine 200 milliGRAM(s) Oral two times a day  insulin lispro (HumaLOG) corrective regimen sliding scale   SubCutaneous three times a day before meals  lactobacillus acidophilus 1 Tablet(s) Oral three times a day  losartan 50 milliGRAM(s) Oral daily  multivitamin 1 Tablet(s) Oral daily  potassium chloride    Tablet ER 20 milliEquivalent(s) Oral every 2 hours  simvastatin 40 milliGRAM(s) Oral at bedtime  topiramate 25 milliGRAM(s) Oral two times a day  triamterene 37.5 mG/hydrochlorothiazide 25 mG Tablet 1 Tablet(s) Oral daily  warfarin 7 milliGRAM(s) Oral once    MEDICATIONS  (PRN):  acetaminophen   Tablet .. 650 milliGRAM(s) Oral every 6 hours PRN Mild Pain (1 - 3)  acetaminophen   Tablet .. 650 milliGRAM(s) Oral every 6 hours PRN Temp greater or equal to 38C (100.4F)  dextrose 40% Gel 15 Gram(s) Oral once PRN Blood Glucose LESS THAN 70 milliGRAM(s)/deciliter  glucagon  Injectable 1 milliGRAM(s) IntraMuscular once PRN Glucose LESS THAN 70 milligrams/deciliter  morphine  - Injectable 2 milliGRAM(s) IV Push every 6 hours PRN Moderate Pain (4 - 6)  senna 2 Tablet(s) Oral at bedtime PRN Constipation      REVIEW OF SYSTEMS:  CONSTITUTIONAL: No fever, weight loss, or fatigue  EYES: No eye pain, visual disturbances, or discharge  ENMT:  No difficulty hearing, tinnitus, vertigo; No sinus or throat pain  NECK: No pain or stiffness  RESPIRATORY: No cough, wheezing, chills or hemoptysis; No shortness of breath  CARDIOVASCULAR: No chest pain, palpitations, dizziness, or leg swelling  GASTROINTESTINAL: No abdominal or epigastric pain. No nausea, vomiting, or hematemesis; No diarrhea or constipation. No melena or hematochezia.  GENITOURINARY: No dysuria, frequency, hematuria, or incontinence  NEUROLOGICAL: No headaches, memory loss, loss of strength, numbness, or tremors  SKIN: No itching, burning, rashes, or lesions   LYMPH NODES: No enlarged glands  ENDOCRINE: No heat or cold intolerance; No hair loss  MUSCULOSKELETAL: No joint pain or swelling; No muscle, back, or extremity pain  PSYCHIATRIC: No depression, anxiety, mood swings, or difficulty sleeping  HEME/LYMPH: No easy bruising, or bleeding gums  ALLERY AND IMMUNOLOGIC: No hives or eczema    RADIOLOGY & ADDITIONAL TESTS:    Imaging Personally Reviewed:  [ ] YES  [ ] NO    Consultant(s) Notes Reviewed:  [ ] YES  [ ] NO    PHYSICAL EXAM:  GENERAL: NAD, well-groomed, well-developed  HEAD:  Atraumatic, Normocephalic  EYES: EOMI, PERRLA, conjunctiva and sclera clear  ENMT: No tonsillar erythema, exudates, or enlargement; Moist mucous membranes, Good dentition, No lesions  NECK: Supple, No JVD, Normal thyroid  NERVOUS SYSTEM:  Alert & Oriented X3, Good concentration; Motor Strength 5/5 B/L upper and lower extremities; DTRs 2+ intact and symmetric  CHEST/LUNG: Clear to percussion bilaterally; No rales, rhonchi, wheezing, or rubs  HEART: Regular rate and rhythm; No murmurs, rubs, or gallops  ABDOMEN: Soft, Nontender, Nondistended; Bowel sounds present  EXTREMITIES:  2+ Peripheral Pulses, No clubbing, cyanosis, or edema  LYMPH: No lymphadenopathy noted  SKIN: No rashes or lesions, more localized tenderness as noted    Care Discussed with Consultants/Other Providers [ ] YES  [ ] NO

## 2019-11-10 NOTE — PROGRESS NOTE ADULT - ATTENDING COMMENTS
Chart reviewed    Patient seen and examined    Agree with plan as outlined above
Seen/examined. agree with above.
I'm available for issues over the remainder of the long weekend, please call if ID input needed.     Joe Green MD  [phone]
I'm available for issues over the weekend, please call if ID input needed.    Joe Green MD  [phone]
Left message for Dr. Perlman Paul Zelenetz, MD  [phone]
pt seen and examined as per pt the rash is larger.   wbc 14  to  7.0  continue IV abx  pt has the first day without a fever.

## 2019-11-10 NOTE — PROGRESS NOTE ADULT - PROBLEM SELECTOR PROBLEM 1
Cellulitis of chest wall

## 2019-11-10 NOTE — PROGRESS NOTE ADULT - ASSESSMENT
52 y/o female w/ PMHx HTN, HLD, TIA, SLE, sarcoidosis, anticardiolipin Ab (on Coumadin), pre-diabetes, MYRTLE, presents to the ED c/o left upper breast/axilla pain x 2 days that did not respond to outpt Bactrim Rx.  Making good progress  No concern of deep infection on exam    Patient will need to remain vigilant about her INR while on, and coming off antibiotics.  Reviewed with patient, "red flags" addressed. All questions answered to the best of my ability.

## 2019-11-10 NOTE — PROGRESS NOTE ADULT - PROBLEM SELECTOR PLAN 1
now on iv Stefanflaro  will repeat sono today to eval for abscess re: more localized area of tenderness and induration

## 2019-11-10 NOTE — PROGRESS NOTE ADULT - SUBJECTIVE AND OBJECTIVE BOX
[facility], Division of Infectious Diseases  ASHLYN Byrd A. Lee  [phone]    Name: LOIS ROBERTS  Age: 54y  Gender: Female  MRN: [id number]    Interval History--  Notes reviewedF=. Feeling ok. No complaints. No fevers, chills, or rigors. Pain control not an issue.     Past Medical History--  Obstructive sleep apnea  Sarcoidosis  Pre-diabetes  Anticardiolipin antibody syndrome  Lupus  Hypertension  Hyperlipidemia  Clotting disorder  H/O  section  No significant past surgical history    For details regarding the patient's social history, family history, and other miscellaneous elements, please refer the initial infectious diseases consultation and/or the admitting history and physical examination for this admission.    Allergies    Paxil (Rash)  penicillin (Rash)    Intolerances    Medications--  Antibiotics:  ceftaroline fosamil IVPB 600 milliGRAM(s) IV Intermittent every 12 hours  hydroxychloroquine 200 milliGRAM(s) Oral two times a day    Immunologic:  Other:  acetaminophen   Tablet .. PRN  acetaminophen   Tablet .. PRN  baclofen  cholecalciferol  dextrose 40% Gel PRN  dextrose 5%.  dextrose 50% Injectable  escitalopram  glucagon  Injectable PRN  insulin lispro (HumaLOG) corrective regimen sliding scale  lactobacillus acidophilus  losartan  morphine  - Injectable PRN  multivitamin  potassium chloride    Tablet ER  senna PRN  simvastatin  topiramate  triamterene 37.5 mG/hydrochlorothiazide 25 mG Tablet  warfarin      Review of Systems--  A 10-point review of systems was obtained.   Review of systems otherwise unchanged compared to prior visit except as previously noted.    Physical Examination--  Vital Signs: T(F): 98.6 (11-10-19 @ 05:58), Max: 99.1 (19 @ 21:33)  HR: 75 (11-10-19 @ 05:58)  BP: 106/67 (11-10-19 @ 05:58)  RR: 16 (11-10-19 @ 05:58)  SpO2: 95% (11-10-19 @ 05:58)  Wt(kg): --  General: Nontoxic-appearing Female in no acute distress.  HEENT: AT/NC. Anicteric. Conjunctiva pink and moist. Oropharynx clear.  Neck: Not rigid. No sense of mass.  Nodes: None palpable.  Lungs: Clear bilaterally without rales, wheezing or rhonchi  Chest: Cellulitis decreased in area of involvement and intensity of erythema. Less tender. Central area ecchymotic, . No induration elsewhere. No bullae or other concerns for deeper process.   Heart: Regular rate and rhythm. No Murmur. No rub. No gallop. No palpable thrill.  Abdomen: Bowel sounds present and normoactive. Soft. Nondistended. Nontender. No sense of mass. No organomegaly.  Extremities: No cyanosis or clubbing. No edema.   Skin: Warm. Dry. Good turgor. No rash. No vasculitic stigmata.  Psychiatric: Appropriate affect and mood for situation.       Laboratory Studies--  CBC                        14.6   7.42  )-----------( 255      ( 2019 09:28 )             44.5       Chemistries      140  |  105  |  13  ----------------------------<  113<H>  3.4<L>   |  29  |  0.79    Ca    9.2      2019 09:28    < from: US Breast Limited, Left (11.10.19 @ 11:03) >  EXAM:  US BREAST LIMITED LT                        PROCEDURE DATE:  11/10/2019    INTERPRETATION:  INDICATION:History of insect bite in the left   axillary/breast region. Rule out abscess or mass  COMPARISON:2019 axilla lateral left breast ultrasound    FINDINGS:Heterogeneous soft tissue appearance is noted with no discrete   solid or cystic mass. Moderate vascular flow is present The appearance is   similar to prior study dated 2019. Infiltrating mass is difficult to   exclude on ultrasound. Clinical evaluation will determine the need for   biopsy.    IMPRESSION:  There are no stable heterogeneous ultrasound appearance of tissue in the   left axilla and lateral left breast. Clinical evaluation will determine   any further workup and/or biopsy.    Infectious inflammatory disease cannot be distinguished from malignancy   on ultrasound evaluation alone.    LILIAM LUCAS M.D.; ATTENDING RADIOLOGIST  This document has been electronically signed. Nov 10 2019 11:11AM  < end of copied text >      Culture Data  Culture - Blood (collected 2019 20:25)  Source: .Blood Blood  Preliminary Report (2019 21:01):    No growth to date.    Culture - Blood (collected 2019 20:25)  Source: .Blood Blood  Preliminary Report (2019 21:01):    No growth to date.

## 2019-11-10 NOTE — PROGRESS NOTE ADULT - SUBJECTIVE AND OBJECTIVE BOX
[facility] -- Jae Valdez, Roberth, Isaak, Silvio Delgadillo Savella  Office # [phone]      Follow Up:    Tachycardia, AC   Subjective/Observations:   No events overnight resting comfortably in bed.  No complaints of chest pain, dyspnea, or palpitations reported. No signs of orthopnea or PND.     REVIEW OF SYSTEMS: All other review of systems is negative unless indicated above    PAST MEDICAL & SURGICAL HISTORY:  Obstructive sleep apnea  Sarcoidosis  Pre-diabetes  Anticardiolipin antibody syndrome  Lupus  Hypertension  Hyperlipidemia  H/O  section      MEDICATIONS  (STANDING):  baclofen 10 milliGRAM(s) Oral three times a day  ceftaroline fosamil IVPB 600 milliGRAM(s) IV Intermittent every 12 hours  cholecalciferol 2000 Unit(s) Oral daily  dextrose 5%. 1000 milliLiter(s) (50 mL/Hr) IV Continuous <Continuous>  dextrose 50% Injectable 12.5 Gram(s) IV Push once  escitalopram 20 milliGRAM(s) Oral daily  hydroxychloroquine 200 milliGRAM(s) Oral two times a day  insulin lispro (HumaLOG) corrective regimen sliding scale   SubCutaneous three times a day before meals  lactobacillus acidophilus 1 Tablet(s) Oral three times a day  losartan 50 milliGRAM(s) Oral daily  multivitamin 1 Tablet(s) Oral daily  potassium chloride    Tablet ER 20 milliEquivalent(s) Oral every 2 hours  simvastatin 40 milliGRAM(s) Oral at bedtime  topiramate 25 milliGRAM(s) Oral two times a day  triamterene 37.5 mG/hydrochlorothiazide 25 mG Tablet 1 Tablet(s) Oral daily  warfarin 7 milliGRAM(s) Oral once    MEDICATIONS  (PRN):  acetaminophen   Tablet .. 650 milliGRAM(s) Oral every 6 hours PRN Mild Pain (1 - 3)  acetaminophen   Tablet .. 650 milliGRAM(s) Oral every 6 hours PRN Temp greater or equal to 38C (100.4F)  dextrose 40% Gel 15 Gram(s) Oral once PRN Blood Glucose LESS THAN 70 milliGRAM(s)/deciliter  glucagon  Injectable 1 milliGRAM(s) IntraMuscular once PRN Glucose LESS THAN 70 milligrams/deciliter  morphine  - Injectable 2 milliGRAM(s) IV Push every 6 hours PRN Moderate Pain (4 - 6)  senna 2 Tablet(s) Oral at bedtime PRN Constipation      Allergies    Paxil (Rash)  penicillin (Rash)    Intolerances        Vital Signs Last 24 Hrs  T(C): 37 (10 Nov 2019 05:58), Max: 37.3 (2019 21:33)  T(F): 98.6 (10 Nov 2019 05:58), Max: 99.1 (2019 21:33)  HR: 75 (10 Nov 2019 05:58) (75 - 80)  BP: 106/67 (10 Nov 2019 05:58) (101/60 - 110/75)  BP(mean): 80 (10 Nov 2019 05:58) (74 - 80)  RR: 16 (10 Nov 2019 05:58) (16 - 17)  SpO2: 95% (10 Nov 2019 05:58) (95% - 96%)    I&O's Summary    2019 07:01  -  10 Nov 2019 07:00  --------------------------------------------------------  IN: 240 mL / OUT: 0 mL / NET: 240 mL          PHYSICAL EXAM:  TELE: Off tele   Constitutional: NAD, awake and alert, well-developed  HEENT: Moist Mucous Membranes, Anicteric  Pulmonary: Non-labored, breath sounds are clear bilaterally, No wheezing, crackles or rhonchi  Cardiovascular: Regular, S1 and S2 nl, No murmurs, rubs, gallops or clicks  Gastrointestinal: Bowel Sounds present, soft, nontender.   Lymph: No lymphadenopathy. No peripheral edema.  Skin: No visible rashes or ulcers.  Psych:  Mood & affect appropriate    LABS: All Labs Reviewed:                        14.6   7.42  )-----------( 255      ( 2019 09:28 )             44.5                         13.0   9.51  )-----------( 200      ( 2019 06:39 )             39.5     2019 09:28    140    |  105    |  13     ----------------------------<  113    3.4     |  29     |  0.79   2019 06:39    141    |  106    |  14     ----------------------------<  104    3.6     |  30     |  0.81     Ca    9.2        2019 09:28  Ca    8.5        2019 06:39      PT/INR - ( 10 Nov 2019 10:20 )   PT: 18.7 sec;   INR: 1.63 ratio              from: Xray Chest 2 Views PA/Lat (19 @ 15:27) >    EXAM:  XR CHEST PA LAT 2V                          PROCEDURE DATE:  2019      INTERPRETATION:  Admission, insect bite.    PA lateral. Prior 2019.    Heart and mediastinum normal.  Lungs clear.  Costophrenic angles sharp   bilaterally.    IMPRESSION: Normal chest    CATARINA SANCHEZ M.D., ATTENDING RADIOLOGIST  This document has been electronically signed. 2019  3:31PM      < end of copied text >    < from: 12 Lead ECG (19 @ 09:20) >    Ventricular Rate 74 BPM    Atrial Rate 74 BPM    P-R Interval 180 ms    QRS Duration 126 ms    Q-T Interval 404 ms    QTC Calculation(Bezet) 448 ms    P Axis 52 degrees    R Axis -2 degrees    T Axis 27 degrees    Diagnosis Line Normal sinus rhythm  Nonspecific intraventricular block  Abnormal ECG  When compared with ECG of 2019 15:46, (Unconfirmed)  No significant change was found  Confirmed by Roberth WISDOM, Dejuan ([id number]) on 2019 12:38:51 PM    < end of copied text >

## 2019-11-10 NOTE — PROGRESS NOTE ADULT - ASSESSMENT
52 y/o female w/ PMHx HTN, HLD, TIA, SLE, sarcoidosis, anticardiolipin Ab (on Coumadin) presents to the ED complaining of left chest and axilla redness worsening over the last 2 days. Likely cellulitis. Consulted for anticoagulation recommendation,    APLS  - Heme following  - Dose Coumadin daily to keep INR 2-3.  Monitor for s/s bleeding    Tachycardia  - Tachycradia was likely reactive to her infectious process.  Abx for cellulitis per ID  - No further evidence of tachycardia per flow sheet  - No need for AVN blocker at this time    HTN  - Controlled  - Continue Losartan (home dose)    HLD  - Continue statin     Continue to follow while admitted  All other w/u per Primary    Dejuan Albarado [facility]  Cardiology   Spectra #[phone] 52 y/o female w/ PMHx HTN, HLD, TIA, SLE, sarcoidosis, anticardiolipin Ab (on Coumadin) presents to the ED complaining of left chest and axilla redness worsening over the last 2 days. Likely cellulitis. Consulted for anticoagulation recommendation,    APLS  - Heme following  - Dose Coumadin daily to keep INR 2-3.  Monitor for s/s bleeding    Tachycardia  - Tachycradia was likely reactive to her infectious process.  Abx for cellulitis per ID  - plan for us today to evaluate for collection  - No further evidence of tachycardia per flow sheet  - No need for AVN blocker at this time    HTN  - Controlled  - Continue Losartan (home dose)    HLD  - Continue statin     Continue to follow while admitted  All other w/u per Primary    Dejuan Albarado [facility]  Cardiology   Spectra #[phone]

## 2019-11-10 NOTE — PROGRESS NOTE ADULT - PROBLEM SELECTOR PLAN 1
Continue ceftaroline  If continues to make progress no objection to discharge in am on:  	doxycycline 100mg PO Q12H  	plus  	ceftin 500mg PO q12H  Duration of PO antibiotics is 7 days

## 2019-11-11 VITALS
RESPIRATION RATE: 17 BRPM | OXYGEN SATURATION: 96 % | DIASTOLIC BLOOD PRESSURE: 68 MMHG | HEART RATE: 67 BPM | WEIGHT: 293 LBS | SYSTOLIC BLOOD PRESSURE: 120 MMHG | TEMPERATURE: 98 F

## 2019-11-11 LAB
ANION GAP SERPL CALC-SCNC: 6 MMOL/L — SIGNIFICANT CHANGE UP (ref 5–17)
BUN SERPL-MCNC: 13 MG/DL — SIGNIFICANT CHANGE UP (ref 7–23)
CALCIUM SERPL-MCNC: 8.9 MG/DL — SIGNIFICANT CHANGE UP (ref 8.5–10.1)
CHLORIDE SERPL-SCNC: 109 MMOL/L — HIGH (ref 96–108)
CO2 SERPL-SCNC: 27 MMOL/L — SIGNIFICANT CHANGE UP (ref 22–31)
CREAT SERPL-MCNC: 0.78 MG/DL — SIGNIFICANT CHANGE UP (ref 0.5–1.3)
CULTURE RESULTS: SIGNIFICANT CHANGE UP
CULTURE RESULTS: SIGNIFICANT CHANGE UP
GLUCOSE SERPL-MCNC: 97 MG/DL — SIGNIFICANT CHANGE UP (ref 70–99)
HCT VFR BLD CALC: 39.8 % — SIGNIFICANT CHANGE UP (ref 34.5–45)
HGB BLD-MCNC: 13.2 G/DL — SIGNIFICANT CHANGE UP (ref 11.5–15.5)
INR BLD: 1.67 RATIO — HIGH (ref 0.88–1.16)
MCHC RBC-ENTMCNC: 31.4 PG — SIGNIFICANT CHANGE UP (ref 27–34)
MCHC RBC-ENTMCNC: 33.2 GM/DL — SIGNIFICANT CHANGE UP (ref 32–36)
MCV RBC AUTO: 94.8 FL — SIGNIFICANT CHANGE UP (ref 80–100)
NRBC # BLD: 0 /100 WBCS — SIGNIFICANT CHANGE UP (ref 0–0)
PLATELET # BLD AUTO: 251 K/UL — SIGNIFICANT CHANGE UP (ref 150–400)
POTASSIUM SERPL-MCNC: 3.4 MMOL/L — LOW (ref 3.5–5.3)
POTASSIUM SERPL-SCNC: 3.4 MMOL/L — LOW (ref 3.5–5.3)
PROTHROM AB SERPL-ACNC: 19.3 SEC — HIGH (ref 10–12.9)
RBC # BLD: 4.2 M/UL — SIGNIFICANT CHANGE UP (ref 3.8–5.2)
RBC # FLD: 13.5 % — SIGNIFICANT CHANGE UP (ref 10.3–14.5)
SODIUM SERPL-SCNC: 142 MMOL/L — SIGNIFICANT CHANGE UP (ref 135–145)
SPECIMEN SOURCE: SIGNIFICANT CHANGE UP
SPECIMEN SOURCE: SIGNIFICANT CHANGE UP
WBC # BLD: 6.45 K/UL — SIGNIFICANT CHANGE UP (ref 3.8–10.5)
WBC # FLD AUTO: 6.45 K/UL — SIGNIFICANT CHANGE UP (ref 3.8–10.5)

## 2019-11-11 PROCEDURE — 76642 ULTRASOUND BREAST LIMITED: CPT

## 2019-11-11 PROCEDURE — 87040 BLOOD CULTURE FOR BACTERIA: CPT

## 2019-11-11 PROCEDURE — 80202 ASSAY OF VANCOMYCIN: CPT

## 2019-11-11 PROCEDURE — 99285 EMERGENCY DEPT VISIT HI MDM: CPT | Mod: 25

## 2019-11-11 PROCEDURE — 93005 ELECTROCARDIOGRAM TRACING: CPT

## 2019-11-11 PROCEDURE — 86140 C-REACTIVE PROTEIN: CPT

## 2019-11-11 PROCEDURE — 82962 GLUCOSE BLOOD TEST: CPT

## 2019-11-11 PROCEDURE — 83036 HEMOGLOBIN GLYCOSYLATED A1C: CPT

## 2019-11-11 PROCEDURE — 85652 RBC SED RATE AUTOMATED: CPT

## 2019-11-11 PROCEDURE — 80048 BASIC METABOLIC PNL TOTAL CA: CPT

## 2019-11-11 PROCEDURE — 81001 URINALYSIS AUTO W/SCOPE: CPT

## 2019-11-11 PROCEDURE — 85027 COMPLETE CBC AUTOMATED: CPT

## 2019-11-11 PROCEDURE — 80053 COMPREHEN METABOLIC PANEL: CPT

## 2019-11-11 PROCEDURE — 80061 LIPID PANEL: CPT

## 2019-11-11 PROCEDURE — 85730 THROMBOPLASTIN TIME PARTIAL: CPT

## 2019-11-11 PROCEDURE — 71046 X-RAY EXAM CHEST 2 VIEWS: CPT

## 2019-11-11 PROCEDURE — 85610 PROTHROMBIN TIME: CPT

## 2019-11-11 PROCEDURE — 96365 THER/PROPH/DIAG IV INF INIT: CPT

## 2019-11-11 PROCEDURE — 83605 ASSAY OF LACTIC ACID: CPT

## 2019-11-11 PROCEDURE — 36415 COLL VENOUS BLD VENIPUNCTURE: CPT

## 2019-11-11 PROCEDURE — 99232 SBSQ HOSP IP/OBS MODERATE 35: CPT

## 2019-11-11 PROCEDURE — 84702 CHORIONIC GONADOTROPIN TEST: CPT

## 2019-11-11 PROCEDURE — 87493 C DIFF AMPLIFIED PROBE: CPT

## 2019-11-11 RX ORDER — ESCITALOPRAM OXALATE 10 MG/1
1 TABLET, FILM COATED ORAL
Qty: 0 | Refills: 0 | DISCHARGE
Start: 2019-11-11

## 2019-11-11 RX ORDER — POTASSIUM CHLORIDE 20 MEQ
40 PACKET (EA) ORAL ONCE
Refills: 0 | Status: COMPLETED | OUTPATIENT
Start: 2019-11-11 | End: 2019-11-11

## 2019-11-11 RX ORDER — LACTOBACILLUS ACIDOPHILUS 100MM CELL
1 CAPSULE ORAL
Qty: 0 | Refills: 0 | DISCHARGE
Start: 2019-11-11 | End: 2019-11-30

## 2019-11-11 RX ORDER — CEFUROXIME AXETIL 250 MG
1 TABLET ORAL
Qty: 14 | Refills: 0
Start: 2019-11-11 | End: 2019-11-17

## 2019-11-11 RX ADMIN — CEFTAROLINE FOSAMIL 50 MILLIGRAM(S): 600 POWDER, FOR SOLUTION INTRAVENOUS at 06:02

## 2019-11-11 RX ADMIN — Medication 10 MILLIGRAM(S): at 06:03

## 2019-11-11 RX ADMIN — Medication 10 MILLIGRAM(S): at 13:02

## 2019-11-11 RX ADMIN — Medication 1 TABLET(S): at 06:02

## 2019-11-11 RX ADMIN — Medication 200 MILLIGRAM(S): at 06:02

## 2019-11-11 RX ADMIN — ESCITALOPRAM OXALATE 20 MILLIGRAM(S): 10 TABLET, FILM COATED ORAL at 12:53

## 2019-11-11 RX ADMIN — Medication 1 TABLET(S): at 13:03

## 2019-11-11 RX ADMIN — LOSARTAN POTASSIUM 50 MILLIGRAM(S): 100 TABLET, FILM COATED ORAL at 06:02

## 2019-11-11 RX ADMIN — Medication 1 TABLET(S): at 12:53

## 2019-11-11 RX ADMIN — Medication 1 TABLET(S): at 06:03

## 2019-11-11 RX ADMIN — Medication 25 MILLIGRAM(S): at 06:02

## 2019-11-11 RX ADMIN — Medication 2000 UNIT(S): at 12:53

## 2019-11-11 RX ADMIN — Medication 40 MILLIEQUIVALENT(S): at 12:53

## 2019-11-11 NOTE — PROGRESS NOTE ADULT - SUBJECTIVE AND OBJECTIVE BOX
[facility] -- Jae Valdez, Isaak Lepe Pannella, Patel, Savella  Office # [phone]      Follow Up:  tachy    Subjective/Observations: Patient seen and examined. Events noted. Resting comfortably in bed. No complaints of chest pain, dyspnea, or palpitations reported. No signs of orthopnea or PND.       REVIEW OF SYSTEMS: All other review of systems is negative unless indicated above    PAST MEDICAL & SURGICAL HISTORY:  Obstructive sleep apnea  Sarcoidosis  Pre-diabetes  Anticardiolipin antibody syndrome  Lupus  Hypertension  Hyperlipidemia  H/O  section      MEDICATIONS  (STANDING):  baclofen 10 milliGRAM(s) Oral three times a day  ceftaroline fosamil IVPB 600 milliGRAM(s) IV Intermittent every 12 hours  cholecalciferol 2000 Unit(s) Oral daily  dextrose 5%. 1000 milliLiter(s) (50 mL/Hr) IV Continuous <Continuous>  dextrose 50% Injectable 12.5 Gram(s) IV Push once  escitalopram 20 milliGRAM(s) Oral daily  hydroxychloroquine 200 milliGRAM(s) Oral two times a day  insulin lispro (HumaLOG) corrective regimen sliding scale   SubCutaneous three times a day before meals  lactobacillus acidophilus 1 Tablet(s) Oral three times a day  losartan 50 milliGRAM(s) Oral daily  multivitamin 1 Tablet(s) Oral daily  simvastatin 40 milliGRAM(s) Oral at bedtime  topiramate 25 milliGRAM(s) Oral two times a day  triamterene 37.5 mG/hydrochlorothiazide 25 mG Tablet 1 Tablet(s) Oral daily    MEDICATIONS  (PRN):  acetaminophen   Tablet .. 650 milliGRAM(s) Oral every 6 hours PRN Mild Pain (1 - 3)  acetaminophen   Tablet .. 650 milliGRAM(s) Oral every 6 hours PRN Temp greater or equal to 38C (100.4F)  dextrose 40% Gel 15 Gram(s) Oral once PRN Blood Glucose LESS THAN 70 milliGRAM(s)/deciliter  glucagon  Injectable 1 milliGRAM(s) IntraMuscular once PRN Glucose LESS THAN 70 milligrams/deciliter  morphine  - Injectable 2 milliGRAM(s) IV Push every 6 hours PRN Moderate Pain (4 - 6)  senna 2 Tablet(s) Oral at bedtime PRN Constipation      Allergies    Paxil (Rash)  penicillin (Rash)    Intolerances            Vital Signs Last 24 Hrs  T(C): 36.7 (2019 05:06), Max: 36.7 (2019 05:06)  T(F): 98 (2019 05:06), Max: 98 (2019 05:06)  HR: 67 (2019 05:06) (67 - 67)  BP: 120/68 (2019 05:06) (120/68 - 120/68)  BP(mean): --  RR: 17 (2019 05:06) (17 - 17)  SpO2: 96% (2019 05:06) (96% - 96%)    I&O's Summary        PHYSICAL EXAM:    Constitutional: NAD, awake and alert, well-developed  HEENT: Moist Mucous Membranes, Anicteric  Pulmonary: Non-labored, breath sounds are clear bilaterally, No wheezing, rales or rhonchi  Cardiovascular: Regular, S1 and S2, No murmurs, rubs, gallops or clicks  Gastrointestinal: Bowel Sounds present, soft, nontender.   Lymph: No peripheral edema. No lymphadenopathy.  Skin: No visible rashes or ulcers.  Psych:  Mood & affect appropriate for situation    LABS: All Labs Reviewed:                        13.2   6.45  )-----------( 251      ( 2019 09:12 )             39.8                         14.6   7.42  )-----------( 255      ( 2019 09:28 )             44.5     2019 08:40    142    |  109    |  13     ----------------------------<  97     3.4     |  27     |  0.78   2019 09:28    140    |  105    |  13     ----------------------------<  113    3.4     |  29     |  0.79     Ca    8.9        2019 08:40  Ca    9.2        2019 09:28      PT/INR - ( 2019 08:40 )   PT: 19.3 sec;   INR: 1.67 ratio

## 2019-11-11 NOTE — DISCHARGE NOTE NURSING/CASE MANAGEMENT/SOCIAL WORK - NSDCPEPTCOWAR_GEN_ALL_CORE
Warfarin/Coumadin - Dietary Advice/Warfarin/Coumadin - Potential for adverse drug reactions and interactions/Warfarin/Coumadin - Compliance/Warfarin/Coumadin - Follow up monitoring

## 2019-11-11 NOTE — DISCHARGE NOTE NURSING/CASE MANAGEMENT/SOCIAL WORK - PATIENT PORTAL LINK FT
You can access the FollowMyHealth Patient Portal offered by [facility] by registering at the following website: [email or web address]. By joining [facility] FollowMyHealth portal, you will also be able to view your health information using other applications (apps) compatible with our system.

## 2019-11-11 NOTE — DISCHARGE NOTE PROVIDER - NSDCMRMEDTOKEN_GEN_ALL_CORE_FT
baclofen 10 mg oral tablet: 1 tab(s) orally 3 times a day.    20mg at the last dosage(2tabs)  Lexapro 20 mg oral tablet: 1 tab(s) orally once a day  losartan 50 mg oral tablet: 1 tab(s) orally once a day  metFORMIN 500 mg oral tablet, extended release: 1 tab(s) orally once a day  Plaquenil 200 mg oral tablet: 1 tab(s) orally 2 times a day  simvastatin 40 mg oral tablet: 1 tab(s) orally once a day (at bedtime)  topiramate 25 mg oral tablet: 1 tab(s) orally 2 times a day  triamterene-hydrochlorothiazide 37.5 mg-25 mg oral tablet: 1 tab(s) orally once a day  Vitamin B Complex 100: 1 cap(s) orally once a day  Vitamin D3: 50 microgram(s) orally 4 times a day  warfarin 2 mg oral tablet: 1 tab(s) orally once a day  warfarin 5 mg oral tablet: 1 tab(s) orally once a day baclofen 10 mg oral tablet: 1 tab(s) orally 3 times a day.    20mg at the last dosage(2tabs)  cefuroxime 500 mg oral tablet: 1 tab(s) orally 2 times a day   doxycycline hyclate 100 mg oral capsule: 1 cap(s) orally 2 times a day   escitalopram 20 mg oral tablet: 1 tab(s) orally once a day  lactobacillus acidophilus oral capsule: 1 cap(s) orally 3 times a day  Lexapro 20 mg oral tablet: 1 tab(s) orally once a day  losartan 50 mg oral tablet: 1 tab(s) orally once a day  metFORMIN 500 mg oral tablet, extended release: 1 tab(s) orally once a day  Plaquenil 200 mg oral tablet: 1 tab(s) orally 2 times a day  simvastatin 40 mg oral tablet: 1 tab(s) orally once a day (at bedtime)  topiramate 25 mg oral tablet: 1 tab(s) orally 2 times a day  triamterene-hydrochlorothiazide 37.5 mg-25 mg oral tablet: 1 tab(s) orally once a day  Vitamin B Complex 100: 1 cap(s) orally once a day  Vitamin D3: 50 microgram(s) orally 4 times a day  warfarin 2 mg oral tablet: 1 tab(s) orally once a day  warfarin 5 mg oral tablet: 1 tab(s) orally once a day

## 2019-11-11 NOTE — PROGRESS NOTE ADULT - REASON FOR ADMISSION
Cellulitis, sepsis

## 2019-11-11 NOTE — DISCHARGE NOTE PROVIDER - HOSPITAL COURSE
HPI:    52 y/o female w/ PMHx HTN, HLD, TIA, SLE, sarcoidosis, anticardiolipin Ab (on Coumadin), pre-diabetes, MYRTLE, presents to the ED c/o left upper breast/axilla pain x 2 days. Pt states it has been progressively getting worse w/ increasing pain, swelling, spreading. The pain is non-radiating, currently 4/10. Pt states she might have been bit by an insect but is not positive, can localize a general area where the erythema and pain started from. Pt reports she became febrile @ tmax 101F yesterday, went to PCP who started her on Bactrim, of which she has taken two doses. Pt reports she took AM dose on empty stomach and had 1 episode of vomiting. Pt denies sick contacts, recent travel. Pt denies chills, HA, dizziness, chest pain, palpitations, SOB, abdominal pain, N/V/D/C.         CXR: No acute findings     U/s breast: Extensive nonspecific subcutaneous edema lateral aspect of the left breast which could represent cellulitis. No discrete localized fluid collection to suggest abscess at this time.                ---    HOSPITAL COURSE:         ---    CONSULTANTS:     Infectious Diseases:    Cardiology:             ---    TIME SPENT:    The total amount of time spent reviewing the hospital notes, laboratory values, imaging findings, assessing/counseling the patient, discussing with consultant physicians, social work, nursing staff took -- minutes        --- HPI:    54 y/o female w/ PMHx HTN, HLD, TIA, SLE, sarcoidosis, anticardiolipin Ab (on Coumadin), pre-diabetes, MYRTLE, presents to the ED c/o left upper breast/axilla pain x 2 days. Pt states it has been progressively getting worse w/ increasing pain, swelling, spreading. The pain is non-radiating, currently 4/10. Pt states she might have been bit by an insect but is not positive, can localize a general area where the erythema and pain started from. Pt reports she became febrile @ tmax 101F yesterday, went to PCP who started her on Bactrim, of which she has taken two doses. Pt reports she took AM dose on empty stomach and had 1 episode of vomiting. Pt denies sick contacts, recent travel. Pt denies chills, HA, dizziness, chest pain, palpitations, SOB, abdominal pain, N/V/D/C.         CXR: No acute findings     U/s breast: Extensive nonspecific subcutaneous edema lateral aspect of the left breast which could represent cellulitis. No discrete localized fluid collection to suggest abscess at this time.                ---    HOSPITAL COURSE: 54 y/o female w/ PMHx HTN, HLD, TIA, SLE, sarcoidosis, anticardiolipin Ab (on Coumadin), pre-diabetes, MYRTLE,        ---    CONSULTANTS:     Infectious Diseases:    Cardiology:             ---    TIME SPENT:    The total amount of time spent reviewing the hospital notes, laboratory values, imaging findings, assessing/counseling the patient, discussing with consultant physicians, social work, nursing staff took -- minutes        --- HPI:    54 y/o female w/ PMHx HTN, HLD, TIA, SLE, sarcoidosis, anticardiolipin Ab (on Coumadin), pre-diabetes, MYRTLE, presents to the ED c/o left upper breast/axilla pain x 2 days. Pt states it has been progressively getting worse w/ increasing pain, swelling, spreading. The pain is non-radiating, currently 4/10. Pt states she might have been bit by an insect but is not positive, can localize a general area where the erythema and pain started from. Pt reports she became febrile @ tmax 101F yesterday, went to PCP who started her on Bactrim, of which she has taken two doses. Pt reports she took AM dose on empty stomach and had 1 episode of vomiting. Pt denies sick contacts, recent travel. Pt denies chills, HA, dizziness, chest pain, palpitations, SOB, abdominal pain, N/V/D/C.             ---    HOSPITAL COURSE: 54 y/o female w/ PMHx HTN, HLD, TIA, SLE, sarcoidosis, anticardiolipin Ab (on Coumadin), pre-diabetes, MYRTLE was admitted to the [facility] for cellulitis of the L. breast. A CXR on admission was performed w/ no acute findings. An ultrasound of the breast extensive nonspecific subcutaneous edema lateral aspect of the left breast which could represent cellulitis. No discrete localized fluid collection to suggest abscess at this time.        ---    CONSULTANTS:     Infectious Diseases:    Cardiology:              ---    TIME SPENT:    The total amount of time spent reviewing the hospital notes, laboratory values, imaging findings, assessing/counseling the patient, discussing with consultant physicians, social work, nursing staff took -- minutes        --- HPI:    52 y/o female w/ PMHx HTN, HLD, TIA, SLE, sarcoidosis, anticardiolipin Ab (on Coumadin), pre-diabetes, MYRTLE, presents to the ED c/o left upper breast/axilla pain x 2 days. Pt states it has been progressively getting worse w/ increasing pain, swelling, spreading. The pain is non-radiating, currently 4/10. Pt states she might have been bit by an insect but is not positive, can localize a general area where the erythema and pain started from. Pt reports she became febrile @ tmax 101F yesterday, went to PCP who started her on Bactrim, of which she has taken two doses. Pt reports she took AM dose on empty stomach and had 1 episode of vomiting. Pt denies sick contacts, recent travel. Pt denies chills, HA, dizziness, chest pain, palpitations, SOB, abdominal pain, N/V/D/C.         ---    HOSPITAL COURSE: 52 y/o female w/ PMHx HTN, HLD, TIA, SLE, sarcoidosis, anticardiolipin Ab (on Coumadin), pre-diabetes, MYRTLE was admitted to the [facility] for cellulitis of the L. breast. A CXR on admission was performed w/ no acute findings. An ultrasound of the breast extensive nonspecific subcutaneous edema lateral aspect of the left breast which could represent cellulitis. No discrete localized fluid collection to suggest abscess. The patient completed a course of IV anti-biotics which she responded well to. Patient was medically optimized and improved clinically throughout hospital stay. Patient seen and examined on day of discharge.        Vital Signs Last 24 Hrs    T(C): 36.7 (11 Nov 2019 05:06), Max: 37.3 (10 Nov 2019 21:21)    T(F): 98 (11 Nov 2019 05:06), Max: 99.2 (10 Nov 2019 21:21)    HR: 67 (11 Nov 2019 05:06) (67 - 77)    BP: 120/68 (11 Nov 2019 05:06) (94/65 - 120/68)    BP(mean): 75 (10 Nov 2019 21:21) (75 - 75)    RR: 17 (11 Nov 2019 05:06) (17 - 17)    SpO2: 96% (11 Nov 2019 05:06) (96% - 96%)        Physical Exam:    General: Well developed, well nourished, in no acute distress    HEENT: NC/AT, PERRLA, EOMI bl, moist mucous membranes     Neurology: AAOx3, nonfocal, CN II-XII grossly intact, sensation intact    Respiratory: CTA B/L, No wheezing, rales, or rhonchi    CV: RRR, S1/S2 present, no murmurs, rubs, or gallops    Abdominal: Soft, nontender, non-distended, normoactive bowel sounds    Extremities: No C/C/E, peripheral pulses present    MSK: Normal ROM, no joint erythema or warmth, no joint swelling     Skin: +Well healing L. breast cellulitis w/ mild amount of blood tinged discharge        Patient is medically stable and cleared for discharge to home with close outpatient follow up.        ---    CONSULTANTS:     Infectious Diseases: Dr. Green     Cardiology: Dr. Delgadillo HPI:    54 y/o female w/ PMHx HTN, HLD, TIA, SLE, sarcoidosis, anticardiolipin Ab (on Coumadin), pre-diabetes, MYRTLE, presents to the ED c/o left upper breast/axilla pain x 2 days. Pt states it has been progressively getting worse w/ increasing pain, swelling, spreading. The pain is non-radiating, currently 4/10. Pt states she might have been bit by an insect but is not positive, can localize a general area where the erythema and pain started from. Pt reports she became febrile @ tmax 101F yesterday, went to PCP who started her on Bactrim, of which she has taken two doses. Pt reports she took AM dose on empty stomach and had 1 episode of vomiting. Pt denies sick contacts, recent travel. Pt denies chills, HA, dizziness, chest pain, palpitations, SOB, abdominal pain, N/V/D/C.         ---    HOSPITAL COURSE: 54 y/o female w/ PMHx HTN, HLD, TIA, SLE, sarcoidosis, anticardiolipin Ab (on Coumadin), pre-diabetes, MYRTLE was admitted to the [facility] for cellulitis of the L. breast. A CXR on admission was performed w/ no acute findings. An ultrasound of the breast extensive nonspecific subcutaneous edema lateral aspect of the left breast which could represent cellulitis. No discrete localized fluid collection to suggest abscess. The patient completed a course of IV anti-biotics which she responded well to. Patient was medically optimized and improved clinically throughout hospital stay. Patient seen and examined on day of discharge.         Vital Signs Last 24 Hrs    T(C): 36.7 (11 Nov 2019 05:06), Max: 37.3 (10 Nov 2019 21:21)    T(F): 98 (11 Nov 2019 05:06), Max: 99.2 (10 Nov 2019 21:21)    HR: 67 (11 Nov 2019 05:06) (67 - 77)    BP: 120/68 (11 Nov 2019 05:06) (94/65 - 120/68)    BP(mean): 75 (10 Nov 2019 21:21) (75 - 75)    RR: 17 (11 Nov 2019 05:06) (17 - 17)    SpO2: 96% (11 Nov 2019 05:06) (96% - 96%)        Physical Exam:    General: Well developed, well nourished, in no acute distress    HEENT: NC/AT, PERRLA, EOMI bl, moist mucous membranes     Neurology: AAOx3, nonfocal, CN II-XII grossly intact, sensation intact    Respiratory: CTA B/L, No wheezing, rales, or rhonchi    CV: RRR, S1/S2 present, no murmurs, rubs, or gallops    Abdominal: Soft, nontender, non-distended, normoactive bowel sounds    Extremities: No C/C/E, peripheral pulses present    MSK: Normal ROM, no joint erythema or warmth, no joint swelling     Skin: +Well healing L. breast cellulitis w/ mild amount of blood tinged discharge        Patient is medically stable and cleared for discharge to home with close outpatient follow up.        ---    CONSULTANTS:     Infectious Diseases: Dr. Green     Cardiology: Dr. Delgadillo HPI:    52 y/o female w/ PMHx HTN, HLD, TIA, SLE, sarcoidosis, anticardiolipin Ab (on Coumadin), pre-diabetes, MYRTLE, presents to the ED c/o left upper breast/axilla pain x 2 days. Pt states it has been progressively getting worse w/ increasing pain, swelling, spreading. The pain is non-radiating, currently 4/10. Pt states she might have been bit by an insect but is not positive, can localize a general area where the erythema and pain started from. Pt reports she became febrile @ tmax 101F yesterday, went to PCP who started her on Bactrim, of which she has taken two doses. Pt reports she took AM dose on empty stomach and had 1 episode of vomiting. Pt denies sick contacts, recent travel. Pt denies chills, HA, dizziness, chest pain, palpitations, SOB, abdominal pain, N/V/D/C.         ---    HOSPITAL COURSE: 52 y/o female w/ PMHx HTN, HLD, TIA, SLE, sarcoidosis, anticardiolipin Ab (on Coumadin), pre-diabetes, MYRTLE was admitted to the [facility] for cellulitis of the L. breast. A CXR on admission was performed w/ no acute findings. An ultrasound of the breast showed extensive nonspecific subcutaneous edema lateral aspect of the left breast which could represent cellulitis. No discrete localized fluid collection to suggest abscess. An underlying neoplasm in the left breast including possibility of inflammatory carcinoma cannot be excluded and the pt will need to follow this up outpatient. The patient completed a course of IV anti-biotics which she responded well to. Patient was medically optimized and improved clinically throughout hospital stay. Patient seen and examined on day of discharge.         Vital Signs Last 24 Hrs    T(C): 36.7 (11 Nov 2019 05:06), Max: 37.3 (10 Nov 2019 21:21)    T(F): 98 (11 Nov 2019 05:06), Max: 99.2 (10 Nov 2019 21:21)    HR: 67 (11 Nov 2019 05:06) (67 - 77)    BP: 120/68 (11 Nov 2019 05:06) (94/65 - 120/68)    BP(mean): 75 (10 Nov 2019 21:21) (75 - 75)    RR: 17 (11 Nov 2019 05:06) (17 - 17)    SpO2: 96% (11 Nov 2019 05:06) (96% - 96%)        Physical Exam:    General: Well developed, well nourished, in no acute distress    HEENT: NC/AT, PERRLA, EOMI bl, moist mucous membranes     Neurology: AAOx3, nonfocal, CN II-XII grossly intact, sensation intact    Respiratory: CTA B/L, No wheezing, rales, or rhonchi    CV: RRR, S1/S2 present, no murmurs, rubs, or gallops    Abdominal: Soft, nontender, non-distended, normoactive bowel sounds    Extremities: No C/C/E, peripheral pulses present    MSK: Normal ROM, no joint erythema or warmth, no joint swelling     Skin: +Well healing L. breast cellulitis w/ mild amount of blood tinged discharge        Patient is medically stable and cleared for discharge to home with close outpatient follow up.        ---    CONSULTANTS:     Infectious Diseases: Dr. Green     Cardiology: Dr. Delgadillo

## 2019-11-11 NOTE — DISCHARGE NOTE PROVIDER - PROVIDER TOKENS
PROVIDER:[TOKEN:[[id number]],FOLLOWUP:[1-3 days]] PROVIDER:[TOKEN:[[id number]],FOLLOWUP:[1-3 days]],FREE:[LAST:[Ob/gyn],PHONE:[(   )    -],FAX:[(   )    -],FOLLOWUP:[2 weeks]]

## 2019-11-11 NOTE — DISCHARGE NOTE PROVIDER - NSDCCPCAREPLAN_GEN_ALL_CORE_FT
PRINCIPAL DISCHARGE DIAGNOSIS  Diagnosis: Cellulitis of chest wall  Assessment and Plan of Treatment: Please take your oral antibiotics, Doxycycline and Ceftin, as prescribed for 7 days. Please contact your PCP or come back to the ER if you experience new or worsening symptoms such as fevers, chills, HA, chest pain, shortness of breath, nausea or vomiting.      SECONDARY DISCHARGE DIAGNOSES  Diagnosis: Anticardiolipin antibody syndrome  Assessment and Plan of Treatment: Continue to take your home medications as previously prescribed. Please follow up with your PCP within 1-3 days to repeat your INR lab value. Please follow up with your Cardiologist within 1 week of discharge.    Diagnosis: Lupus  Assessment and Plan of Treatment: Please continue to take your home medications are previously prescribed and follow up with your Rheumatologist within 3-5 days of discharge. PRINCIPAL DISCHARGE DIAGNOSIS  Diagnosis: Cellulitis of chest wall  Assessment and Plan of Treatment: Please take your oral antibiotics, Doxycycline and Ceftin, as prescribed for 7 days. Please take a pro-biotic as directed, continue to take for 2 weeks after completion of anti-biotics. Please contact your PCP or come back to the ER if you experience new or worsening symptoms such as fevers, chills, HA, chest pain, shortness of breath, nausea or vomiting.      SECONDARY DISCHARGE DIAGNOSES  Diagnosis: Anticardiolipin antibody syndrome  Assessment and Plan of Treatment: Continue to take your home medications as previously prescribed. Please follow up with your PCP within 1-3 days to repeat your INR lab value. Please follow up with your Cardiologist within 1 week of discharge.    Diagnosis: Lupus  Assessment and Plan of Treatment: Please continue to take your home medications are previously prescribed and follow up with your Rheumatologist within 3-5 days of discharge. PRINCIPAL DISCHARGE DIAGNOSIS  Diagnosis: Cellulitis of chest wall  Assessment and Plan of Treatment: Please take your oral antibiotics, Doxycycline and Ceftin, as prescribed for 7 days. Please take a pro-biotic as directed, continue to take for 2 weeks after completion of anti-biotics. Please contact your PCP or come back to the ER if you experience new or worsening symptoms such as fevers, chills, HA, chest pain, shortness of breath, nausea or vomiting. Please follow up with your Ob/gyn regarding results of breast ultrasound.      SECONDARY DISCHARGE DIAGNOSES  Diagnosis: Anticardiolipin antibody syndrome  Assessment and Plan of Treatment: Continue to take your home medications as previously prescribed. Please follow up with your PCP within 1-3 days to repeat your INR lab value. Please follow up with your Cardiologist within 1 week of discharge.    Diagnosis: Lupus  Assessment and Plan of Treatment: Please continue to take your home medications are previously prescribed and follow up with your Rheumatologist within 3-5 days of discharge. PRINCIPAL DISCHARGE DIAGNOSIS  Diagnosis: Cellulitis of chest wall  Assessment and Plan of Treatment: Please take your oral antibiotics, Doxycycline and Ceftin, as prescribed for 7 days. Please buy over the counter probiotic as directed, continue to take for 2 weeks after completion of antibiotics. Please contact your PCP or come back to the ER if you experience new or worsening symptoms such as fevers, chills, HA, chest pain, shortness of breath, nausea or vomiting. Please follow up with your Ob/gyn regarding results of breast ultrasound.      SECONDARY DISCHARGE DIAGNOSES  Diagnosis: Lupus  Assessment and Plan of Treatment: Please continue to take your home medications are previously prescribed and follow up with your Rheumatologist within 3-5 days of discharge.    Diagnosis: Anticardiolipin antibody syndrome  Assessment and Plan of Treatment: Continue to take your home medications as previously prescribed. Please follow up with your PCP within 1-3 days to repeat your INR lab value. Please follow up with your Cardiologist within 1 week of discharge.

## 2019-11-11 NOTE — DISCHARGE NOTE PROVIDER - CARE PROVIDER_API CALL
Jeremiah Duarte (DO)  Family Medicine  [address] [place] [address]  Phone: [phone]  Fax: [phone]  Follow Up Time: 1-3 days Jeremiah Duarte (DO)  Family Medicine  [address] [place] [address]  Phone: [phone]  Fax: [phone]  Follow Up Time: 1-3 days    Ob/gyn,   Phone: (   )    -  Fax: (   )    -  Follow Up Time: 2 weeks

## 2019-11-11 NOTE — DISCHARGE NOTE PROVIDER - NSDCFUADDINST_GEN_ALL_CORE_FT
- Please make an appointment for follow up with your primary doctor in 1-3 days  - Please make an appointment for follow up with  GYN and Rheumatology (information above)  - Patient or caretaker is responsible for making all appointments  - Please return to the ED if you develop worsening symptoms or fever

## 2019-11-11 NOTE — PROGRESS NOTE ADULT - ASSESSMENT
52 y/o female w/ PMHx HTN, HLD, TIA, SLE, sarcoidosis, anticardiolipin Ab (on Coumadin) presents to the ED complaining of left chest and axilla redness worsening over the last 2 days. Likely cellulitis. Consulted for anticoagulation recommendation,    APLS  - Heme following  - Dose Coumadin daily to keep INR 2-3.  Monitor for s/s bleeding    Tachycardia  - Tachycradia was likely reactive to her infectious process.  Abx for cellulitis per ID  - plan for us today to evaluate for collection  - No further evidence of tachycardia per flow sheet  - No need for AVN blocker at this time    HTN  - Controlled  - Continue Losartan (home dose)    HLD  - Continue statin     Continue to follow while admitted  All other w/u per Primary

## 2020-03-17 NOTE — CONSULT NOTE ADULT - CONSULT REQUESTED BY NAME
On behalf of myself and care team, I would like to thank you for entrusting us with your care today. It is our goal to provide you and your family with excellent care.   Please note that you may receive a survey in the mail; any feedback you have for this [place] washed. Anesthetic jelly may be applied to numb the urethra. Other pain medicine is usually not needed. In some cases, you may be offered a mild sedative to help you relax.  If a more extensive procedure is to be done, such as a biopsy or kidney stone remov

## 2021-01-28 NOTE — PROGRESS NOTE ADULT - PROBLEM SELECTOR PLAN 6
Per pt - pre-diabetic, on Metformin at home   - Hold Metformin   - Low dose ISS, accu checks, hypoglycemic protocol   - F/u AM A1C, today AIC- Per pt - pre-diabetic, on Metformin at home   - Hold Metformin   - Low dose ISS, accu checks, hypoglycemic protocol   - A1C 11/7/19-5.4 Chronic  - Continue home statin qhs  - Lipid panel (11/7/19) negative Mucosal Advancement Flap Text: Given the location of the defect, shape of the defect and the proximity to free margins a mucosal advancement flap was deemed most appropriate. Incisions were made with a 15 blade scalpel in the appropriate fashion along the cutaneous vermillion border and the mucosal lip. The remaining actinically damaged mucosal tissue was excised.  The mucosal advancement flap was then elevated to the gingival sulcus with care taken to preserve the neurovascular structures and advanced into the primary defect. Care was taken to ensure that precise realignment of the vermillion border was achieved.

## 2021-03-24 ENCOUNTER — EMERGENCY (EMERGENCY)
Facility: HOSPITAL | Age: 56
LOS: 1 days | Discharge: HOME | End: 2021-03-24
Attending: EMERGENCY MEDICINE | Admitting: EMERGENCY MEDICINE
Payer: COMMERCIAL

## 2021-03-24 VITALS
HEIGHT: 65 IN | OXYGEN SATURATION: 97 % | RESPIRATION RATE: 22 BRPM | TEMPERATURE: 98 F | WEIGHT: 293 LBS | HEART RATE: 95 BPM | DIASTOLIC BLOOD PRESSURE: 82 MMHG | SYSTOLIC BLOOD PRESSURE: 131 MMHG

## 2021-03-24 VITALS
DIASTOLIC BLOOD PRESSURE: 76 MMHG | OXYGEN SATURATION: 97 % | SYSTOLIC BLOOD PRESSURE: 126 MMHG | HEART RATE: 83 BPM | RESPIRATION RATE: 20 BRPM | TEMPERATURE: 99 F

## 2021-03-24 DIAGNOSIS — Z98.891 HISTORY OF UTERINE SCAR FROM PREVIOUS SURGERY: Chronic | ICD-10-CM

## 2021-03-24 LAB
BASOPHILS # BLD AUTO: 0.04 K/UL — SIGNIFICANT CHANGE UP (ref 0–0.2)
BASOPHILS NFR BLD AUTO: 0.4 % — SIGNIFICANT CHANGE UP (ref 0–2)
EOSINOPHIL # BLD AUTO: 0.09 K/UL — SIGNIFICANT CHANGE UP (ref 0–0.5)
EOSINOPHIL NFR BLD AUTO: 0.8 % — SIGNIFICANT CHANGE UP (ref 0–6)
HCT VFR BLD CALC: 42.5 % — SIGNIFICANT CHANGE UP (ref 34.5–45)
HGB BLD-MCNC: 14.2 G/DL — SIGNIFICANT CHANGE UP (ref 11.5–15.5)
IMM GRANULOCYTES NFR BLD AUTO: 0.5 % — SIGNIFICANT CHANGE UP (ref 0–1.5)
INR BLD: 1.77 RATIO — HIGH (ref 0.88–1.16)
LYMPHOCYTES # BLD AUTO: 18.6 % — SIGNIFICANT CHANGE UP (ref 13–44)
LYMPHOCYTES # BLD AUTO: 2.04 K/UL — SIGNIFICANT CHANGE UP (ref 1–3.3)
MCHC RBC-ENTMCNC: 31.1 PG — SIGNIFICANT CHANGE UP (ref 27–34)
MCHC RBC-ENTMCNC: 33.4 GM/DL — SIGNIFICANT CHANGE UP (ref 32–36)
MCV RBC AUTO: 93.2 FL — SIGNIFICANT CHANGE UP (ref 80–100)
MONOCYTES # BLD AUTO: 0.82 K/UL — SIGNIFICANT CHANGE UP (ref 0–0.9)
MONOCYTES NFR BLD AUTO: 7.5 % — SIGNIFICANT CHANGE UP (ref 2–14)
NEUTROPHILS # BLD AUTO: 7.93 K/UL — HIGH (ref 1.8–7.4)
NEUTROPHILS NFR BLD AUTO: 72.2 % — SIGNIFICANT CHANGE UP (ref 43–77)
NRBC # BLD: 0 /100 WBCS — SIGNIFICANT CHANGE UP (ref 0–0)
PLATELET # BLD AUTO: 215 K/UL — SIGNIFICANT CHANGE UP (ref 150–400)
PROTHROM AB SERPL-ACNC: 20.8 SEC — HIGH (ref 10.6–13.6)
RBC # BLD: 4.56 M/UL — SIGNIFICANT CHANGE UP (ref 3.8–5.2)
RBC # FLD: 13.4 % — SIGNIFICANT CHANGE UP (ref 10.3–14.5)
WBC # BLD: 10.98 K/UL — HIGH (ref 3.8–10.5)
WBC # FLD AUTO: 10.98 K/UL — HIGH (ref 3.8–10.5)

## 2021-03-24 PROCEDURE — 99284 EMERGENCY DEPT VISIT MOD MDM: CPT

## 2021-03-24 PROCEDURE — 73502 X-RAY EXAM HIP UNI 2-3 VIEWS: CPT

## 2021-03-24 PROCEDURE — 36415 COLL VENOUS BLD VENIPUNCTURE: CPT

## 2021-03-24 PROCEDURE — 85025 COMPLETE CBC W/AUTO DIFF WBC: CPT

## 2021-03-24 PROCEDURE — 73562 X-RAY EXAM OF KNEE 3: CPT

## 2021-03-24 PROCEDURE — 73552 X-RAY EXAM OF FEMUR 2/>: CPT | Mod: 26,RT

## 2021-03-24 PROCEDURE — 85610 PROTHROMBIN TIME: CPT

## 2021-03-24 PROCEDURE — 99284 EMERGENCY DEPT VISIT MOD MDM: CPT | Mod: 25

## 2021-03-24 PROCEDURE — 73562 X-RAY EXAM OF KNEE 3: CPT | Mod: 26,RT

## 2021-03-24 PROCEDURE — 73502 X-RAY EXAM HIP UNI 2-3 VIEWS: CPT | Mod: 26,RT

## 2021-03-24 PROCEDURE — 73552 X-RAY EXAM OF FEMUR 2/>: CPT

## 2021-03-24 NOTE — ED PROVIDER NOTE - CLINICAL SUMMARY MEDICAL DECISION MAKING FREE TEXT BOX
54 y/o female s/p fall c/o right upper leg pain. Took Tylenol at home. Declines pain medication at this time, is on coumadin, will check INR. Will check x-ray to r/o fracture.

## 2021-03-24 NOTE — ED PROVIDER NOTE - PATIENT PORTAL LINK FT
You can access the FollowMyHealth Patient Portal offered by [facility] by registering at the following website: [email or web address]. By joining [employer]’s FollowMyHealth portal, you will also be able to view your health information using other applications (apps) compatible with our system.

## 2021-03-24 NOTE — ED PROVIDER NOTE - MUSCULOSKELETAL, MLM
Pt ambulatory with limp, FROM right leg, but with obvious discomfort with ROM hip and knee, positive TTP right posterior upper leg from gluteal fold to popliteal fossa.

## 2021-03-24 NOTE — ED PROVIDER NOTE - SKIN, MLM
slight area ecchymosis, right superolateral posterior thigh (pt reports this area non TTP) No other bruising or wounds noted.

## 2021-03-24 NOTE — ED PROVIDER NOTE - NSFOLLOWUPINSTRUCTIONS_ED_ALL_ED_FT
Knee Sprain    WHAT YOU NEED TO KNOW:    What is a knee sprain? A knee sprain is a stretched or torn ligament in your knee. Ligaments support the knee and keep the joint and bones in the correct position. A knee sprain may involve one or more ligaments.    Knee Anatomy          What increases my risk for a knee sprain?   •Not using the correct shoes or protective gear during activity      •Not warming up or stretching before exercise      •Too much exercise at one time, or a sudden increase in exercise      What are the signs and symptoms of a knee sprain?   •Stiffness or decreased movement      •Pain or tenderness      •Painful pop that you can hear or feel      •Swelling or bruising      •Knee that rosalia or gives out when you try to walk      How is a knee sprain diagnosed? Your healthcare provider will ask about your injury and examine you. Tell him or her if you heard a snap or pop when you were injured. Your provider will check the movement and strength of your joint. You may be asked to move the joint. You may also need any of the following:   •An x-ray, CT scan or MRI may show the sprain or other damage. You may be given contrast liquid to help your injury show up better in the pictures. Tell the healthcare provider if you have ever had an allergic reaction to contrast liquid. Do not enter the MRI room with anything metal. Metal can cause serious injury. Tell the healthcare provider if you have any metal in or on your body.      •Arthroscopy is a procedure to look inside your joint with a scope. The scope is a long tube with a magnifying glass, a camera, and a light on the end.      How is a knee sprain treated? Treatment depends on the type and cause of your knee sprain. You may need any of the following:   •NSAIDs, such as ibuprofen, help decrease swelling, pain, and fever. This medicine is available with or without a doctor's order. NSAIDs can cause stomach bleeding or kidney problems in certain people. If you take blood thinner medicine, always ask your healthcare provider if NSAIDs are safe for you. Always read the medicine label and follow directions.      •Acetaminophen decreases pain and fever. It is available without a doctor's order. Ask how much to take and how often to take it. Follow directions. Read the labels of all other medicines you are using to see if they also contain acetaminophen, or ask your doctor or pharmacist. Acetaminophen can cause liver damage if not taken correctly. Do not use more than 4 grams (4,000 milligrams) total of acetaminophen in one day.       •Prescription pain medicine may be given. Ask your healthcare provider how to take this medicine safely. Some prescription pain medicines contain acetaminophen. Do not take other medicines that contain acetaminophen without talking to your healthcare provider. Too much acetaminophen may cause liver damage. Prescription pain medicine may cause constipation. Ask your healthcare provider how to prevent or treat constipation.       •A support device such as a splint or brace may be needed. These devices limit movement and protect the joint while it heals. You may be given crutches to use until you can stand on your injured leg without pain.      •Physical therapy may be needed. A physical therapist teaches you exercises to help improve movement and strength, and to decrease pain.      •Surgery may be needed if other treatments do not work or your strain is severe. Surgery may include a knee arthroscopy to look inside your knee joint and repair damage.      How can I manage a knee sprain?   •Rest your knee and do not exercise. Do not walk on your injured leg if you are told to keep weight off your knee. Rest helps decrease swelling and allows the injury to heal. You can do gentle range of motion exercises as directed to prevent stiffness.      •Apply ice on your knee for 15 to 20 minutes every hour or as directed. Use an ice pack, or put crushed ice in a plastic bag. Cover the bag with a towel before you apply it. Ice helps prevent tissue damage and decreases swelling and pain.      •Apply compression to your knee as directed. You may need to wear an elastic bandage. This helps keep your injured knee from moving too much while it heals. It should be tight enough to give support but so tight that it causes your toes to feel numb or tingly. Take the bandage off and rewrap it at least 1 time each day.  How to Wrap an Elastic Bandage           •Elevate your knee above the level of your heart as often as you can. This will help decrease swelling and pain. Prop your leg on pillows or blankets to keep it elevated comfortably. Do not put pillows directly behind your knee.  Elevate Leg           How can I prevent another knee sprain? Exercise your legs to keep your muscles strong. Strong leg muscles help protect your knee and prevent strain. The following may also prevent a knee sprain:   •Slowly start your exercise or training program. Slowly increase the time, distance, and intensity of your exercise. Sudden increases in training may cause another knee sprain.      •Wear protective braces and equipment as directed. Braces may prevent your knee from moving the wrong way and causing another sprain. Protective equipment may support your bones and ligaments to prevent injury.      •Warm up and stretch before exercise. Warm up by walking or using an exercise bike before starting your regular exercise. Do gentle stretches after warming up. This helps to loosen your muscles and decrease stress on your knee. Cool down and stretch after you exercise.      •Wear shoes that fit correctly and support your feet. Replace your running or exercise shoes before the padding or shock absorption is worn out. Ask your healthcare provider which exercise shoes are best for you. Ask if you should wear shoe inserts. Shoe inserts can help support your heels and arches or keep your foot lined up correctly in your shoes. Exercise on flat surfaces.      When should I seek immediate care?   •Any part of your leg feels cold, numb, or looks pale.          When should I call my doctor?   •You have new or increased swelling, bruising, or pain in your knee.      •Your symptoms do not improve within 6 weeks, even with treatment.      •You have questions or concerns about your condition or care.      CARE AGREEMENT:    You have the right to help plan your care. Learn about your health condition and how it may be treated. Discuss treatment options with your healthcare providers to decide what care you want to receive. You always have the right to refuse treatment.

## 2021-03-24 NOTE — ED PROVIDER NOTE - NSFOLLOWUPCLINICS_GEN_ALL_ED_FT
[facility]  Orthopedic Surgery  [address] [place] [id number]  Phone: [phone]  Fax:   Follow Up Time: 1-3 Days

## 2021-03-24 NOTE — ED ADULT NURSE NOTE - NSIMPLEMENTINTERV_GEN_ALL_ED
Implemented All Universal Safety Interventions:  [place] to call system. Call bell, personal items and telephone within reach. Instruct patient to call for assistance. Room bathroom lighting operational. Non-slip footwear when patient is off stretcher. Physically safe environment: no spills, clutter or unnecessary equipment. Stretcher in lowest position, wheels locked, appropriate side rails in place.

## 2021-03-24 NOTE — ED PROVIDER NOTE - CARE PLAN
Principal Discharge DX:	Avulsion fracture of bone  Secondary Diagnosis:	Sprain of thigh, right, initial encounter

## 2021-03-24 NOTE — ED PROVIDER NOTE - PMH
Anticardiolipin antibody syndrome    Hyperlipidemia    Hypertension    Lupus    Obstructive sleep apnea    Pre-diabetes    Sarcoidosis

## 2021-03-24 NOTE — ED PROVIDER NOTE - OBJECTIVE STATEMENT
54 y/o female on coumadin states she fell approximately 3 hours ago. She tripped over [employer]. Partially landing on vacc[employer]SUCCESS under her right thigh.  States initially, was unable to get up about 30 mins. Then was able to move knee, and eventually stand. Most of the pain is to the posterior right thigh under buttocks down to popliteal fossa. Pt has chronic paresthesia related to hip bursitis unchanged.

## 2021-03-25 PROBLEM — D68.61 ANTIPHOSPHOLIPID SYNDROME: Chronic | Status: ACTIVE | Noted: 2019-11-06

## 2021-03-25 PROBLEM — D86.9 SARCOIDOSIS, UNSPECIFIED: Chronic | Status: ACTIVE | Noted: 2019-11-06

## 2021-03-25 PROBLEM — G47.33 OBSTRUCTIVE SLEEP APNEA (ADULT) (PEDIATRIC): Chronic | Status: ACTIVE | Noted: 2019-11-06

## 2021-03-25 PROBLEM — R73.03 PREDIABETES: Chronic | Status: ACTIVE | Noted: 2019-11-06

## 2021-12-29 NOTE — DIETITIAN INITIAL EVALUATION ADULT. - ADD RECOMMEND
No Current diet remains appropriate at this time. Encourage po intake of nutrient dense, portion controlled meals/snacks. Pt provided with written and verbal nutrition education on weight management. Topics discussed include portion control, higher intake of fruits/vegetables/whole grains, physical activity as medically feasible, and tracking meals/snacks to determine caloric intake and manage weight.

## 2022-05-09 NOTE — ED ADULT TRIAGE NOTE - HEART RATE (BEATS/MIN)
Patient here today for Covid test. Procedure 5/13/22.     Joyce Valentin CNA .............................on 5/9/2022 at 8:33 AM     108

## 2023-03-27 NOTE — ED ADULT NURSE NOTE - SUICIDE SCREENING QUESTION 3
Quality 431: Preventive Care And Screening: Unhealthy Alcohol Use - Screening: Patient not identified as an unhealthy alcohol user when screened for unhealthy alcohol use using a systematic screening method Quality 130: Documentation Of Current Medications In The Medical Record: Current Medications Documented Detail Level: Detailed Quality 226: Preventive Care And Screening: Tobacco Use: Screening And Cessation Intervention: Patient screened for tobacco use and is an ex/non-smoker No

## 2023-04-12 NOTE — PROGRESS NOTE ADULT - PROBLEM SELECTOR PROBLEM 4
Repair Performed By Another Provider Text (Leave Blank If You Do Not Want): After obtaining clear surgical margins the defect was repaired by another provider. Lupus
